# Patient Record
Sex: FEMALE | Race: WHITE
[De-identification: names, ages, dates, MRNs, and addresses within clinical notes are randomized per-mention and may not be internally consistent; named-entity substitution may affect disease eponyms.]

---

## 2018-01-26 ENCOUNTER — HOSPITAL ENCOUNTER (OUTPATIENT)
Dept: HOSPITAL 8 - STAR | Age: 78
Discharge: HOME | End: 2018-01-26
Attending: SURGERY
Payer: COMMERCIAL

## 2018-01-26 DIAGNOSIS — Z01.812: Primary | ICD-10-CM

## 2018-01-26 DIAGNOSIS — R79.89: ICD-10-CM

## 2018-01-26 LAB
ANION GAP SERPL CALC-SCNC: 7 MMOL/L (ref 5–15)
BASOPHILS # BLD AUTO: 0.05 X10^3/UL (ref 0–0.1)
BASOPHILS NFR BLD AUTO: 1 % (ref 0–1)
CALCIUM SERPL-MCNC: 9.3 MG/DL (ref 8.5–10.1)
CHLORIDE SERPL-SCNC: 104 MMOL/L (ref 98–107)
CREAT SERPL-MCNC: 0.79 MG/DL (ref 0.55–1.02)
EOSINOPHIL # BLD AUTO: 0.13 X10^3/UL (ref 0–0.4)
EOSINOPHIL NFR BLD AUTO: 2 % (ref 1–7)
ERYTHROCYTE [DISTWIDTH] IN BLOOD BY AUTOMATED COUNT: 13.8 % (ref 9.6–15.2)
LYMPHOCYTES # BLD AUTO: 1.92 X10^3/UL (ref 1–3.4)
LYMPHOCYTES NFR BLD AUTO: 27 % (ref 22–44)
MCH RBC QN AUTO: 32.7 PG (ref 27–34.8)
MCHC RBC AUTO-ENTMCNC: 33.7 G/DL (ref 32.4–35.8)
MCV RBC AUTO: 97.2 FL (ref 80–100)
MD: NO
MONOCYTES # BLD AUTO: 0.46 X10^3/UL (ref 0.2–0.8)
MONOCYTES NFR BLD AUTO: 6 % (ref 2–9)
NEUTROPHILS # BLD AUTO: 4.61 X10^3/UL (ref 1.8–6.8)
NEUTROPHILS NFR BLD AUTO: 64 % (ref 42–75)
PLATELET # BLD AUTO: 214 X10^3/UL (ref 130–400)
PMV BLD AUTO: 8.3 FL (ref 7.4–10.4)
RBC # BLD AUTO: 4.89 X10^6/UL (ref 3.82–5.3)

## 2018-01-26 PROCEDURE — 36415 COLL VENOUS BLD VENIPUNCTURE: CPT

## 2018-01-26 PROCEDURE — 80048 BASIC METABOLIC PNL TOTAL CA: CPT

## 2018-01-26 PROCEDURE — 85025 COMPLETE CBC W/AUTO DIFF WBC: CPT

## 2018-01-31 ENCOUNTER — HOSPITAL ENCOUNTER (OUTPATIENT)
Dept: HOSPITAL 8 - OUT | Age: 78
Discharge: HOME | End: 2018-01-31
Attending: SURGERY
Payer: COMMERCIAL

## 2018-01-31 VITALS — SYSTOLIC BLOOD PRESSURE: 128 MMHG | DIASTOLIC BLOOD PRESSURE: 82 MMHG

## 2018-01-31 VITALS — WEIGHT: 111.33 LBS | BODY MASS INDEX: 20.75 KG/M2 | HEIGHT: 61.5 IN

## 2018-01-31 DIAGNOSIS — I70.212: Primary | ICD-10-CM

## 2018-01-31 DIAGNOSIS — Z90.49: ICD-10-CM

## 2018-01-31 DIAGNOSIS — Z87.39: ICD-10-CM

## 2018-01-31 DIAGNOSIS — Z90.710: ICD-10-CM

## 2018-01-31 DIAGNOSIS — Z98.890: ICD-10-CM

## 2018-01-31 PROCEDURE — 37236 OPEN/PERQ PLACE STENT 1ST: CPT

## 2018-01-31 PROCEDURE — 99157 MOD SED OTHER PHYS/QHP EA: CPT

## 2018-01-31 PROCEDURE — C1769 GUIDE WIRE: HCPCS

## 2018-01-31 PROCEDURE — C1725 CATH, TRANSLUMIN NON-LASER: HCPCS

## 2018-01-31 PROCEDURE — C1876 STENT, NON-COA/NON-COV W/DEL: HCPCS

## 2018-01-31 PROCEDURE — C1894 INTRO/SHEATH, NON-LASER: HCPCS

## 2018-01-31 PROCEDURE — 99156 MOD SED OTH PHYS/QHP 5/>YRS: CPT

## 2018-01-31 PROCEDURE — 75710 ARTERY X-RAYS ARM/LEG: CPT

## 2018-09-18 LAB
ANION GAP SERPL CALC-SCNC: 7 MMOL/L (ref 5–15)
BASOPHILS # BLD AUTO: 0.03 X10^3/UL (ref 0–0.1)
BASOPHILS NFR BLD AUTO: 1 % (ref 0–1)
CALCIUM SERPL-MCNC: 9 MG/DL (ref 8.5–10.1)
CHLORIDE SERPL-SCNC: 108 MMOL/L (ref 98–107)
CREAT SERPL-MCNC: 0.79 MG/DL (ref 0.55–1.02)
EOSINOPHIL # BLD AUTO: 0.1 X10^3/UL (ref 0–0.4)
EOSINOPHIL NFR BLD AUTO: 2 % (ref 1–7)
ERYTHROCYTE [DISTWIDTH] IN BLOOD BY AUTOMATED COUNT: 14.3 % (ref 9.6–15.2)
LYMPHOCYTES # BLD AUTO: 1.91 X10^3/UL (ref 1–3.4)
LYMPHOCYTES NFR BLD AUTO: 32 % (ref 22–44)
MCH RBC QN AUTO: 32 PG (ref 27–34.8)
MCHC RBC AUTO-ENTMCNC: 33.3 G/DL (ref 32.4–35.8)
MCV RBC AUTO: 96.2 FL (ref 80–100)
MD: NO
MONOCYTES # BLD AUTO: 0.66 X10^3/UL (ref 0.2–0.8)
MONOCYTES NFR BLD AUTO: 11 % (ref 2–9)
NEUTROPHILS # BLD AUTO: 3.36 X10^3/UL (ref 1.8–6.8)
NEUTROPHILS NFR BLD AUTO: 56 % (ref 42–75)
PLATELET # BLD AUTO: 193 X10^3/UL (ref 130–400)
PMV BLD AUTO: 8.5 FL (ref 7.4–10.4)
RBC # BLD AUTO: 4.75 X10^6/UL (ref 3.82–5.3)

## 2018-09-21 ENCOUNTER — HOSPITAL ENCOUNTER (OUTPATIENT)
Dept: HOSPITAL 8 - OUT | Age: 78
Discharge: HOME | End: 2018-09-21
Attending: SURGERY
Payer: COMMERCIAL

## 2018-09-21 VITALS — WEIGHT: 113.32 LBS | BODY MASS INDEX: 21.39 KG/M2 | HEIGHT: 61 IN

## 2018-09-21 VITALS — SYSTOLIC BLOOD PRESSURE: 193 MMHG | DIASTOLIC BLOOD PRESSURE: 68 MMHG

## 2018-09-21 DIAGNOSIS — E78.00: ICD-10-CM

## 2018-09-21 DIAGNOSIS — Z90.49: ICD-10-CM

## 2018-09-21 DIAGNOSIS — Z87.891: ICD-10-CM
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
## 2018-09-21 DIAGNOSIS — I10: ICD-10-CM

## 2018-09-21 DIAGNOSIS — E03.9: ICD-10-CM
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
## 2018-09-21 DIAGNOSIS — Z98.890: ICD-10-CM

## 2018-09-21 DIAGNOSIS — M65.861: ICD-10-CM

## 2018-09-21 DIAGNOSIS — Z90.710: ICD-10-CM

## 2018-09-21 DIAGNOSIS — Z87.39: ICD-10-CM

## 2018-09-21 DIAGNOSIS — Z88.0: ICD-10-CM

## 2018-09-21 DIAGNOSIS — Z88.5: ICD-10-CM
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
## 2018-09-21 DIAGNOSIS — S83.251A: Primary | ICD-10-CM

## 2018-09-21 DIAGNOSIS — F41.9: ICD-10-CM

## 2018-09-21 PROCEDURE — 75710 ARTERY X-RAYS ARM/LEG: CPT

## 2018-09-21 PROCEDURE — 99157 MOD SED OTHER PHYS/QHP EA: CPT

## 2018-09-21 PROCEDURE — 80048 BASIC METABOLIC PNL TOTAL CA: CPT

## 2018-09-21 PROCEDURE — C1769 GUIDE WIRE: HCPCS

## 2018-09-21 PROCEDURE — C1751 CATH, INF, PER/CENT/MIDLINE: HCPCS

## 2018-09-21 PROCEDURE — 36140 INTRO NDL ICATH UPR/LXTR ART: CPT

## 2018-09-21 PROCEDURE — 36415 COLL VENOUS BLD VENIPUNCTURE: CPT

## 2018-09-21 PROCEDURE — 85025 COMPLETE CBC W/AUTO DIFF WBC: CPT

## 2018-09-21 PROCEDURE — 76937 US GUIDE VASCULAR ACCESS: CPT

## 2018-09-21 PROCEDURE — 99156 MOD SED OTH PHYS/QHP 5/>YRS: CPT

## 2018-09-21 PROCEDURE — C1894 INTRO/SHEATH, NON-LASER: HCPCS

## 2020-02-19 ENCOUNTER — APPOINTMENT (RX ONLY)
Dept: URBAN - METROPOLITAN AREA CLINIC 22 | Facility: CLINIC | Age: 80
Setting detail: DERMATOLOGY
End: 2020-02-19

## 2020-02-19 DIAGNOSIS — L82.1 OTHER SEBORRHEIC KERATOSIS: ICD-10-CM

## 2020-02-19 DIAGNOSIS — L82.0 INFLAMED SEBORRHEIC KERATOSIS: ICD-10-CM

## 2020-02-19 DIAGNOSIS — L81.4 OTHER MELANIN HYPERPIGMENTATION: ICD-10-CM

## 2020-02-19 DIAGNOSIS — D18.0 HEMANGIOMA: ICD-10-CM

## 2020-02-19 DIAGNOSIS — D22 MELANOCYTIC NEVI: ICD-10-CM

## 2020-02-19 DIAGNOSIS — Z85.828 PERSONAL HISTORY OF OTHER MALIGNANT NEOPLASM OF SKIN: ICD-10-CM

## 2020-02-19 PROBLEM — D48.5 NEOPLASM OF UNCERTAIN BEHAVIOR OF SKIN: Status: ACTIVE | Noted: 2020-02-19

## 2020-02-19 PROBLEM — D18.01 HEMANGIOMA OF SKIN AND SUBCUTANEOUS TISSUE: Status: ACTIVE | Noted: 2020-02-19

## 2020-02-19 PROBLEM — D22.5 MELANOCYTIC NEVI OF TRUNK: Status: ACTIVE | Noted: 2020-02-19

## 2020-02-19 PROCEDURE — 99203 OFFICE O/P NEW LOW 30 MIN: CPT | Mod: 25

## 2020-02-19 PROCEDURE — 11102 TANGNTL BX SKIN SINGLE LES: CPT | Mod: 59

## 2020-02-19 PROCEDURE — 17110 DESTRUCTION B9 LES UP TO 14: CPT

## 2020-02-19 PROCEDURE — ? BIOPSY BY SHAVE METHOD

## 2020-02-19 PROCEDURE — ? COUNSELING

## 2020-02-19 PROCEDURE — ? LIQUID NITROGEN

## 2020-02-19 ASSESSMENT — LOCATION SIMPLE DESCRIPTION DERM
LOCATION SIMPLE: RIGHT FOREHEAD
LOCATION SIMPLE: ABDOMEN
LOCATION SIMPLE: LEFT SUPERIOR EYELID
LOCATION SIMPLE: NOSE
LOCATION SIMPLE: LEFT FOREARM
LOCATION SIMPLE: LEFT UPPER BACK
LOCATION SIMPLE: CHEST
LOCATION SIMPLE: RIGHT CHEEK

## 2020-02-19 ASSESSMENT — LOCATION ZONE DERM
LOCATION ZONE: TRUNK
LOCATION ZONE: ARM
LOCATION ZONE: NOSE
LOCATION ZONE: FACE
LOCATION ZONE: EYELID

## 2020-02-19 ASSESSMENT — LOCATION DETAILED DESCRIPTION DERM
LOCATION DETAILED: LEFT MEDIAL SUPERIOR EYELID
LOCATION DETAILED: LEFT PROXIMAL DORSAL FOREARM
LOCATION DETAILED: NASAL ROOT
LOCATION DETAILED: RIGHT INFERIOR LATERAL FOREHEAD
LOCATION DETAILED: RIGHT SUPERIOR CENTRAL MALAR CHEEK
LOCATION DETAILED: LEFT LATERAL UPPER BACK
LOCATION DETAILED: MIDDLE STERNUM
LOCATION DETAILED: EPIGASTRIC SKIN

## 2021-01-13 DIAGNOSIS — Z23 NEED FOR VACCINATION: ICD-10-CM

## 2022-01-01 ENCOUNTER — APPOINTMENT (OUTPATIENT)
Dept: RADIOLOGY | Facility: MEDICAL CENTER | Age: 82
DRG: 196 | End: 2022-01-01
Attending: STUDENT IN AN ORGANIZED HEALTH CARE EDUCATION/TRAINING PROGRAM
Payer: MEDICARE

## 2022-01-01 ENCOUNTER — HOSPICE ADMISSION (OUTPATIENT)
Dept: HOSPICE | Facility: HOSPICE | Age: 82
End: 2022-01-01

## 2022-01-01 ENCOUNTER — APPOINTMENT (OUTPATIENT)
Dept: RADIOLOGY | Facility: MEDICAL CENTER | Age: 82
DRG: 196 | End: 2022-01-01
Attending: EMERGENCY MEDICINE
Payer: MEDICARE

## 2022-01-01 ENCOUNTER — HOSPITAL ENCOUNTER (INPATIENT)
Facility: MEDICAL CENTER | Age: 82
LOS: 2 days | DRG: 196 | End: 2022-02-02
Attending: EMERGENCY MEDICINE | Admitting: HOSPITALIST
Payer: MEDICARE

## 2022-01-01 ENCOUNTER — APPOINTMENT (OUTPATIENT)
Dept: CARDIOLOGY | Facility: MEDICAL CENTER | Age: 82
DRG: 196 | End: 2022-01-01
Attending: STUDENT IN AN ORGANIZED HEALTH CARE EDUCATION/TRAINING PROGRAM
Payer: MEDICARE

## 2022-01-01 VITALS
HEIGHT: 61 IN | WEIGHT: 106.04 LBS | SYSTOLIC BLOOD PRESSURE: 91 MMHG | HEART RATE: 101 BPM | TEMPERATURE: 97.7 F | RESPIRATION RATE: 20 BRPM | BODY MASS INDEX: 20.02 KG/M2 | DIASTOLIC BLOOD PRESSURE: 55 MMHG | OXYGEN SATURATION: 93 %

## 2022-01-01 DIAGNOSIS — J96.21 ACUTE ON CHRONIC RESPIRATORY FAILURE WITH HYPOXIA (HCC): ICD-10-CM

## 2022-01-01 DIAGNOSIS — I50.9 CONGESTIVE HEART FAILURE, UNSPECIFIED HF CHRONICITY, UNSPECIFIED HEART FAILURE TYPE (HCC): ICD-10-CM

## 2022-01-01 DIAGNOSIS — N17.9 AKI (ACUTE KIDNEY INJURY) (HCC): ICD-10-CM

## 2022-01-01 DIAGNOSIS — J84.9 INTERSTITIAL LUNG DISEASE (HCC): ICD-10-CM

## 2022-01-01 DIAGNOSIS — I48.91 ATRIAL FIBRILLATION WITH RAPID VENTRICULAR RESPONSE (HCC): ICD-10-CM

## 2022-01-01 LAB
ALBUMIN SERPL BCP-MCNC: 3.2 G/DL (ref 3.2–4.9)
ALBUMIN SERPL BCP-MCNC: 3.4 G/DL (ref 3.2–4.9)
ALBUMIN SERPL BCP-MCNC: 3.5 G/DL (ref 3.2–4.9)
ALBUMIN/GLOB SERPL: 1.5 G/DL
ALBUMIN/GLOB SERPL: 1.5 G/DL
ALBUMIN/GLOB SERPL: 1.8 G/DL
ALP SERPL-CCNC: 122 U/L (ref 30–99)
ALP SERPL-CCNC: 130 U/L (ref 30–99)
ALP SERPL-CCNC: 142 U/L (ref 30–99)
ALT SERPL-CCNC: 137 U/L (ref 2–50)
ALT SERPL-CCNC: 46 U/L (ref 2–50)
ALT SERPL-CCNC: 57 U/L (ref 2–50)
ANION GAP SERPL CALC-SCNC: 12 MMOL/L (ref 7–16)
ANION GAP SERPL CALC-SCNC: 12 MMOL/L (ref 7–16)
ANION GAP SERPL CALC-SCNC: 13 MMOL/L (ref 7–16)
ANION GAP SERPL CALC-SCNC: 9 MMOL/L (ref 7–16)
APTT PPP: 35 SEC (ref 24.7–36)
AST SERPL-CCNC: 180 U/L (ref 12–45)
AST SERPL-CCNC: 31 U/L (ref 12–45)
AST SERPL-CCNC: 53 U/L (ref 12–45)
BASOPHILS # BLD AUTO: 0 % (ref 0–1.8)
BASOPHILS # BLD AUTO: 0.4 % (ref 0–1.8)
BASOPHILS # BLD: 0 K/UL (ref 0–0.12)
BASOPHILS # BLD: 0.04 K/UL (ref 0–0.12)
BILIRUB SERPL-MCNC: 0.9 MG/DL (ref 0.1–1.5)
BILIRUB SERPL-MCNC: 1.6 MG/DL (ref 0.1–1.5)
BILIRUB SERPL-MCNC: 2 MG/DL (ref 0.1–1.5)
BUN SERPL-MCNC: 10 MG/DL (ref 8–22)
BUN SERPL-MCNC: 19 MG/DL (ref 8–22)
BUN SERPL-MCNC: 29 MG/DL (ref 8–22)
BUN SERPL-MCNC: 35 MG/DL (ref 8–22)
CALCIUM SERPL-MCNC: 7.7 MG/DL (ref 8.5–10.5)
CALCIUM SERPL-MCNC: 7.9 MG/DL (ref 8.5–10.5)
CALCIUM SERPL-MCNC: 7.9 MG/DL (ref 8.5–10.5)
CALCIUM SERPL-MCNC: 8 MG/DL (ref 8.5–10.5)
CHLORIDE SERPL-SCNC: 100 MMOL/L (ref 96–112)
CHLORIDE SERPL-SCNC: 101 MMOL/L (ref 96–112)
CHLORIDE SERPL-SCNC: 107 MMOL/L (ref 96–112)
CHLORIDE SERPL-SCNC: 99 MMOL/L (ref 96–112)
CO2 SERPL-SCNC: 26 MMOL/L (ref 20–33)
CO2 SERPL-SCNC: 26 MMOL/L (ref 20–33)
CO2 SERPL-SCNC: 28 MMOL/L (ref 20–33)
CO2 SERPL-SCNC: 29 MMOL/L (ref 20–33)
CORTIS SERPL-MCNC: 86.5 UG/DL (ref 0–23)
CREAT SERPL-MCNC: 0.44 MG/DL (ref 0.5–1.4)
CREAT SERPL-MCNC: 1.07 MG/DL (ref 0.5–1.4)
CREAT SERPL-MCNC: 1.81 MG/DL (ref 0.5–1.4)
CREAT SERPL-MCNC: 1.96 MG/DL (ref 0.5–1.4)
CRP SERPL HS-MCNC: 16.7 MG/DL (ref 0–0.75)
DIGOXIN SERPL-MCNC: 2.6 NG/ML (ref 0.8–2)
DIGOXIN SERPL-MCNC: 2.7 NG/ML (ref 0.8–2)
EKG IMPRESSION: NORMAL
EOSINOPHIL # BLD AUTO: 0 K/UL (ref 0–0.51)
EOSINOPHIL # BLD AUTO: 0.14 K/UL (ref 0–0.51)
EOSINOPHIL NFR BLD: 0 % (ref 0–6.9)
EOSINOPHIL NFR BLD: 1.5 % (ref 0–6.9)
ERYTHROCYTE [DISTWIDTH] IN BLOOD BY AUTOMATED COUNT: 49.6 FL (ref 35.9–50)
ERYTHROCYTE [DISTWIDTH] IN BLOOD BY AUTOMATED COUNT: 50.6 FL (ref 35.9–50)
ERYTHROCYTE [DISTWIDTH] IN BLOOD BY AUTOMATED COUNT: 52.8 FL (ref 35.9–50)
ERYTHROCYTE [SEDIMENTATION RATE] IN BLOOD BY WESTERGREN METHOD: 10 MM/HOUR (ref 0–25)
FLUAV RNA SPEC QL NAA+PROBE: NEGATIVE
FLUBV RNA SPEC QL NAA+PROBE: NEGATIVE
GLOBULIN SER CALC-MCNC: 1.9 G/DL (ref 1.9–3.5)
GLOBULIN SER CALC-MCNC: 2.1 G/DL (ref 1.9–3.5)
GLOBULIN SER CALC-MCNC: 2.3 G/DL (ref 1.9–3.5)
GLUCOSE SERPL-MCNC: 75 MG/DL (ref 65–99)
GLUCOSE SERPL-MCNC: 79 MG/DL (ref 65–99)
GLUCOSE SERPL-MCNC: 83 MG/DL (ref 65–99)
GLUCOSE SERPL-MCNC: 91 MG/DL (ref 65–99)
HCT VFR BLD AUTO: 39 % (ref 37–47)
HCT VFR BLD AUTO: 39.2 % (ref 37–47)
HCT VFR BLD AUTO: 41.9 % (ref 37–47)
HGB BLD-MCNC: 11.8 G/DL (ref 12–16)
HGB BLD-MCNC: 12.2 G/DL (ref 12–16)
HGB BLD-MCNC: 13 G/DL (ref 12–16)
HIV 1+2 AB+HIV1 P24 AG SERPL QL IA: NORMAL
IMM GRANULOCYTES # BLD AUTO: 0.04 K/UL (ref 0–0.11)
IMM GRANULOCYTES NFR BLD AUTO: 0.4 % (ref 0–0.9)
INR PPP: 1.21 (ref 0.87–1.13)
INR PPP: 2.68 (ref 0.87–1.13)
LACTATE BLD-SCNC: 1.9 MMOL/L (ref 0.5–2)
LACTATE BLD-SCNC: 2.3 MMOL/L (ref 0.5–2)
LV EJECT FRACT  99904: 55
LV EJECT FRACT MOD 2C 99903: 50.17
LV EJECT FRACT MOD 4C 99902: 51.43
LV EJECT FRACT MOD BP 99901: 50.48
LYMPHOCYTES # BLD AUTO: 0 K/UL (ref 1–4.8)
LYMPHOCYTES # BLD AUTO: 0.92 K/UL (ref 1–4.8)
LYMPHOCYTES NFR BLD: 0 % (ref 22–41)
LYMPHOCYTES NFR BLD: 9.8 % (ref 22–41)
MAGNESIUM SERPL-MCNC: 1.8 MG/DL (ref 1.5–2.5)
MAGNESIUM SERPL-MCNC: 1.9 MG/DL (ref 1.5–2.5)
MANUAL DIFF BLD: ABNORMAL
MCH RBC QN AUTO: 30.7 PG (ref 27–33)
MCH RBC QN AUTO: 31 PG (ref 27–33)
MCH RBC QN AUTO: 31 PG (ref 27–33)
MCHC RBC AUTO-ENTMCNC: 30.3 G/DL (ref 33.6–35)
MCHC RBC AUTO-ENTMCNC: 31 G/DL (ref 33.6–35)
MCHC RBC AUTO-ENTMCNC: 31.1 G/DL (ref 33.6–35)
MCV RBC AUTO: 102.4 FL (ref 81.4–97.8)
MCV RBC AUTO: 98.5 FL (ref 81.4–97.8)
MCV RBC AUTO: 99.8 FL (ref 81.4–97.8)
METAMYELOCYTES NFR BLD MANUAL: 18.3 %
MONOCYTES # BLD AUTO: 0.29 K/UL (ref 0–0.85)
MONOCYTES # BLD AUTO: 0.74 K/UL (ref 0–0.85)
MONOCYTES NFR BLD AUTO: 1.7 % (ref 0–13.4)
MONOCYTES NFR BLD AUTO: 7.9 % (ref 0–13.4)
MORPHOLOGY BLD-IMP: NORMAL
MYELOCYTES NFR BLD MANUAL: 1.7 %
NEUTROPHILS # BLD AUTO: 13.47 K/UL (ref 2–7.15)
NEUTROPHILS # BLD AUTO: 7.54 K/UL (ref 2–7.15)
NEUTROPHILS NFR BLD: 66.1 % (ref 44–72)
NEUTROPHILS NFR BLD: 80 % (ref 44–72)
NEUTS BAND NFR BLD MANUAL: 12.2 % (ref 0–10)
NRBC # BLD AUTO: 0 K/UL
NRBC # BLD AUTO: 0 K/UL
NRBC BLD-RTO: 0 /100 WBC
NRBC BLD-RTO: 0 /100 WBC
NT-PROBNP SERPL IA-MCNC: 1864 PG/ML (ref 0–125)
NT-PROBNP SERPL IA-MCNC: ABNORMAL PG/ML (ref 0–125)
PLATELET # BLD AUTO: 110 K/UL (ref 164–446)
PLATELET # BLD AUTO: 157 K/UL (ref 164–446)
PLATELET # BLD AUTO: 181 K/UL (ref 164–446)
PLATELET BLD QL SMEAR: NORMAL
PMV BLD AUTO: 11.5 FL (ref 9–12.9)
PMV BLD AUTO: 9.8 FL (ref 9–12.9)
PMV BLD AUTO: 9.9 FL (ref 9–12.9)
POTASSIUM SERPL-SCNC: 4 MMOL/L (ref 3.6–5.5)
POTASSIUM SERPL-SCNC: 4.2 MMOL/L (ref 3.6–5.5)
POTASSIUM SERPL-SCNC: 4.4 MMOL/L (ref 3.6–5.5)
POTASSIUM SERPL-SCNC: 4.7 MMOL/L (ref 3.6–5.5)
PROCALCITONIN SERPL-MCNC: 0.05 NG/ML
PROT SERPL-MCNC: 5.3 G/DL (ref 6–8.2)
PROT SERPL-MCNC: 5.4 G/DL (ref 6–8.2)
PROT SERPL-MCNC: 5.7 G/DL (ref 6–8.2)
PROTHROMBIN TIME: 14.9 SEC (ref 12–14.6)
PROTHROMBIN TIME: 27.7 SEC (ref 12–14.6)
RBC # BLD AUTO: 3.81 M/UL (ref 4.2–5.4)
RBC # BLD AUTO: 3.98 M/UL (ref 4.2–5.4)
RBC # BLD AUTO: 4.2 M/UL (ref 4.2–5.4)
RBC BLD AUTO: NORMAL
RHEUMATOID FACT SER IA-ACNC: 16 IU/ML (ref 0–14)
RSV RNA SPEC QL NAA+PROBE: NEGATIVE
SARS-COV-2 RNA RESP QL NAA+PROBE: NOTDETECTED
SARS-COV-2 RNA RESP QL NAA+PROBE: NOTDETECTED
SODIUM SERPL-SCNC: 140 MMOL/L (ref 135–145)
SODIUM SERPL-SCNC: 142 MMOL/L (ref 135–145)
SPECIMEN SOURCE: NORMAL
SPECIMEN SOURCE: NORMAL
T4 FREE SERPL-MCNC: 1.3 NG/DL (ref 0.93–1.7)
TROPONIN T SERPL-MCNC: 18 NG/L (ref 6–19)
TSH SERPL DL<=0.005 MIU/L-ACNC: 2.72 UIU/ML (ref 0.38–5.33)
WBC # BLD AUTO: 17.2 K/UL (ref 4.8–10.8)
WBC # BLD AUTO: 8.9 K/UL (ref 4.8–10.8)
WBC # BLD AUTO: 9.4 K/UL (ref 4.8–10.8)

## 2022-01-01 PROCEDURE — 83735 ASSAY OF MAGNESIUM: CPT

## 2022-01-01 PROCEDURE — 87389 HIV-1 AG W/HIV-1&-2 AB AG IA: CPT

## 2022-01-01 PROCEDURE — 700111 HCHG RX REV CODE 636 W/ 250 OVERRIDE (IP): Performed by: INTERNAL MEDICINE

## 2022-01-01 PROCEDURE — 700105 HCHG RX REV CODE 258: Performed by: STUDENT IN AN ORGANIZED HEALTH CARE EDUCATION/TRAINING PROGRAM

## 2022-01-01 PROCEDURE — 86331 IMMUNODIFFUSION OUCHTERLONY: CPT | Mod: 91

## 2022-01-01 PROCEDURE — 770020 HCHG ROOM/CARE - TELE (206)

## 2022-01-01 PROCEDURE — 85730 THROMBOPLASTIN TIME PARTIAL: CPT

## 2022-01-01 PROCEDURE — 80162 ASSAY OF DIGOXIN TOTAL: CPT

## 2022-01-01 PROCEDURE — 84439 ASSAY OF FREE THYROXINE: CPT

## 2022-01-01 PROCEDURE — 700102 HCHG RX REV CODE 250 W/ 637 OVERRIDE(OP): Performed by: STUDENT IN AN ORGANIZED HEALTH CARE EDUCATION/TRAINING PROGRAM

## 2022-01-01 PROCEDURE — 71045 X-RAY EXAM CHEST 1 VIEW: CPT

## 2022-01-01 PROCEDURE — 85027 COMPLETE CBC AUTOMATED: CPT

## 2022-01-01 PROCEDURE — 93306 TTE W/DOPPLER COMPLETE: CPT | Mod: 26 | Performed by: INTERNAL MEDICINE

## 2022-01-01 PROCEDURE — 99291 CRITICAL CARE FIRST HOUR: CPT | Performed by: STUDENT IN AN ORGANIZED HEALTH CARE EDUCATION/TRAINING PROGRAM

## 2022-01-01 PROCEDURE — 700111 HCHG RX REV CODE 636 W/ 250 OVERRIDE (IP): Performed by: HOSPITALIST

## 2022-01-01 PROCEDURE — 700111 HCHG RX REV CODE 636 W/ 250 OVERRIDE (IP): Performed by: EMERGENCY MEDICINE

## 2022-01-01 PROCEDURE — C9803 HOPD COVID-19 SPEC COLLECT: HCPCS | Performed by: EMERGENCY MEDICINE

## 2022-01-01 PROCEDURE — 700111 HCHG RX REV CODE 636 W/ 250 OVERRIDE (IP): Performed by: STUDENT IN AN ORGANIZED HEALTH CARE EDUCATION/TRAINING PROGRAM

## 2022-01-01 PROCEDURE — 93306 TTE W/DOPPLER COMPLETE: CPT

## 2022-01-01 PROCEDURE — A9270 NON-COVERED ITEM OR SERVICE: HCPCS | Performed by: STUDENT IN AN ORGANIZED HEALTH CARE EDUCATION/TRAINING PROGRAM

## 2022-01-01 PROCEDURE — 99232 SBSQ HOSP IP/OBS MODERATE 35: CPT | Performed by: INTERNAL MEDICINE

## 2022-01-01 PROCEDURE — 99221 1ST HOSP IP/OBS SF/LOW 40: CPT | Mod: AI,GC | Performed by: HOSPITALIST

## 2022-01-01 PROCEDURE — 36415 COLL VENOUS BLD VENIPUNCTURE: CPT

## 2022-01-01 PROCEDURE — 84145 PROCALCITONIN (PCT): CPT

## 2022-01-01 PROCEDURE — 94760 N-INVAS EAR/PLS OXIMETRY 1: CPT

## 2022-01-01 PROCEDURE — 82533 TOTAL CORTISOL: CPT

## 2022-01-01 PROCEDURE — 85007 BL SMEAR W/DIFF WBC COUNT: CPT

## 2022-01-01 PROCEDURE — 97535 SELF CARE MNGMENT TRAINING: CPT

## 2022-01-01 PROCEDURE — 97162 PT EVAL MOD COMPLEX 30 MIN: CPT

## 2022-01-01 PROCEDURE — 80053 COMPREHEN METABOLIC PANEL: CPT

## 2022-01-01 PROCEDURE — 0241U HCHG SARS-COV-2 COVID-19 NFCT DS RESP RNA 4 TRGT MIC: CPT

## 2022-01-01 PROCEDURE — 83516 IMMUNOASSAY NONANTIBODY: CPT

## 2022-01-01 PROCEDURE — 99222 1ST HOSP IP/OBS MODERATE 55: CPT | Performed by: INTERNAL MEDICINE

## 2022-01-01 PROCEDURE — 83605 ASSAY OF LACTIC ACID: CPT

## 2022-01-01 PROCEDURE — 96375 TX/PRO/DX INJ NEW DRUG ADDON: CPT

## 2022-01-01 PROCEDURE — 93010 ELECTROCARDIOGRAM REPORT: CPT | Mod: 76 | Performed by: INTERNAL MEDICINE

## 2022-01-01 PROCEDURE — 96376 TX/PRO/DX INJ SAME DRUG ADON: CPT

## 2022-01-01 PROCEDURE — 86235 NUCLEAR ANTIGEN ANTIBODY: CPT | Mod: 91

## 2022-01-01 PROCEDURE — 86200 CCP ANTIBODY: CPT

## 2022-01-01 PROCEDURE — 85652 RBC SED RATE AUTOMATED: CPT

## 2022-01-01 PROCEDURE — 96374 THER/PROPH/DIAG INJ IV PUSH: CPT

## 2022-01-01 PROCEDURE — 700111 HCHG RX REV CODE 636 W/ 250 OVERRIDE (IP)

## 2022-01-01 PROCEDURE — 86431 RHEUMATOID FACTOR QUANT: CPT

## 2022-01-01 PROCEDURE — 51798 US URINE CAPACITY MEASURE: CPT

## 2022-01-01 PROCEDURE — 73030 X-RAY EXAM OF SHOULDER: CPT | Mod: RT

## 2022-01-01 PROCEDURE — 86140 C-REACTIVE PROTEIN: CPT

## 2022-01-01 PROCEDURE — 93010 ELECTROCARDIOGRAM REPORT: CPT | Performed by: INTERNAL MEDICINE

## 2022-01-01 PROCEDURE — 93005 ELECTROCARDIOGRAM TRACING: CPT | Performed by: EMERGENCY MEDICINE

## 2022-01-01 PROCEDURE — 86038 ANTINUCLEAR ANTIBODIES: CPT

## 2022-01-01 PROCEDURE — 99221 1ST HOSP IP/OBS SF/LOW 40: CPT | Mod: GC | Performed by: INTERNAL MEDICINE

## 2022-01-01 PROCEDURE — 74018 RADEX ABDOMEN 1 VIEW: CPT

## 2022-01-01 PROCEDURE — 700117 HCHG RX CONTRAST REV CODE 255: Performed by: EMERGENCY MEDICINE

## 2022-01-01 PROCEDURE — 85610 PROTHROMBIN TIME: CPT

## 2022-01-01 PROCEDURE — 700101 HCHG RX REV CODE 250: Performed by: STUDENT IN AN ORGANIZED HEALTH CARE EDUCATION/TRAINING PROGRAM

## 2022-01-01 PROCEDURE — 93005 ELECTROCARDIOGRAM TRACING: CPT | Performed by: STUDENT IN AN ORGANIZED HEALTH CARE EDUCATION/TRAINING PROGRAM

## 2022-01-01 PROCEDURE — 99285 EMERGENCY DEPT VISIT HI MDM: CPT

## 2022-01-01 PROCEDURE — 700105 HCHG RX REV CODE 258: Performed by: HOSPITALIST

## 2022-01-01 PROCEDURE — 80048 BASIC METABOLIC PNL TOTAL CA: CPT

## 2022-01-01 PROCEDURE — 84484 ASSAY OF TROPONIN QUANT: CPT

## 2022-01-01 PROCEDURE — 99233 SBSQ HOSP IP/OBS HIGH 50: CPT | Mod: GC | Performed by: HOSPITALIST

## 2022-01-01 PROCEDURE — 85025 COMPLETE CBC W/AUTO DIFF WBC: CPT

## 2022-01-01 PROCEDURE — 86606 ASPERGILLUS ANTIBODY: CPT | Mod: 91

## 2022-01-01 PROCEDURE — 83880 ASSAY OF NATRIURETIC PEPTIDE: CPT

## 2022-01-01 PROCEDURE — 71275 CT ANGIOGRAPHY CHEST: CPT

## 2022-01-01 PROCEDURE — U0003 INFECTIOUS AGENT DETECTION BY NUCLEIC ACID (DNA OR RNA); SEVERE ACUTE RESPIRATORY SYNDROME CORONAVIRUS 2 (SARS-COV-2) (CORONAVIRUS DISEASE [COVID-19]), AMPLIFIED PROBE TECHNIQUE, MAKING USE OF HIGH THROUGHPUT TECHNOLOGIES AS DESCRIBED BY CMS-2020-01-R: HCPCS

## 2022-01-01 PROCEDURE — U0005 INFEC AGEN DETEC AMPLI PROBE: HCPCS

## 2022-01-01 PROCEDURE — 84443 ASSAY THYROID STIM HORMONE: CPT

## 2022-01-01 RX ORDER — LORAZEPAM 2 MG/ML
1 INJECTION INTRAMUSCULAR
Status: DISCONTINUED | OUTPATIENT
Start: 2022-01-01 | End: 2022-01-01 | Stop reason: HOSPADM

## 2022-01-01 RX ORDER — MORPHINE SULFATE 4 MG/ML
2 INJECTION INTRAVENOUS ONCE
Status: DISCONTINUED | OUTPATIENT
Start: 2022-01-01 | End: 2022-01-01

## 2022-01-01 RX ORDER — DIGOXIN 125 MCG
125 TABLET ORAL EVERY MORNING
Status: DISCONTINUED | OUTPATIENT
Start: 2022-01-01 | End: 2022-01-01

## 2022-01-01 RX ORDER — SODIUM CHLORIDE 9 MG/ML
500 INJECTION, SOLUTION INTRAVENOUS ONCE
Status: COMPLETED | OUTPATIENT
Start: 2022-01-01 | End: 2022-01-01

## 2022-01-01 RX ORDER — IPRATROPIUM BROMIDE AND ALBUTEROL SULFATE 2.5; .5 MG/3ML; MG/3ML
3 SOLUTION RESPIRATORY (INHALATION) EVERY 6 HOURS PRN
Status: DISCONTINUED | OUTPATIENT
Start: 2022-01-01 | End: 2022-01-01 | Stop reason: HOSPADM

## 2022-01-01 RX ORDER — FUROSEMIDE 10 MG/ML
40 INJECTION INTRAMUSCULAR; INTRAVENOUS
Status: DISCONTINUED | OUTPATIENT
Start: 2022-01-01 | End: 2022-01-01

## 2022-01-01 RX ORDER — ATORVASTATIN CALCIUM 40 MG/1
40 TABLET, FILM COATED ORAL NIGHTLY
COMMUNITY

## 2022-01-01 RX ORDER — CHOLECALCIFEROL (VITAMIN D3) 125 MCG
5 CAPSULE ORAL
Status: DISCONTINUED | OUTPATIENT
Start: 2022-01-01 | End: 2022-01-01 | Stop reason: HOSPADM

## 2022-01-01 RX ORDER — CLOPIDOGREL BISULFATE 75 MG/1
75 TABLET ORAL EVERY MORNING
COMMUNITY

## 2022-01-01 RX ORDER — DOCUSATE SODIUM 100 MG/1
100 CAPSULE, LIQUID FILLED ORAL EVERY 12 HOURS
Status: DISCONTINUED | OUTPATIENT
Start: 2022-01-01 | End: 2022-01-01 | Stop reason: HOSPADM

## 2022-01-01 RX ORDER — DIGOXIN 0.25 MG/ML
500 INJECTION INTRAMUSCULAR; INTRAVENOUS ONCE
Status: DISCONTINUED | OUTPATIENT
Start: 2022-01-01 | End: 2022-01-01

## 2022-01-01 RX ORDER — FUROSEMIDE 10 MG/ML
40 INJECTION INTRAMUSCULAR; INTRAVENOUS ONCE
Status: COMPLETED | OUTPATIENT
Start: 2022-01-01 | End: 2022-01-01

## 2022-01-01 RX ORDER — DIGOXIN 0.25 MG/ML
125 INJECTION INTRAMUSCULAR; INTRAVENOUS ONCE
Status: DISCONTINUED | OUTPATIENT
Start: 2022-01-01 | End: 2022-01-01

## 2022-01-01 RX ORDER — HYDROMORPHONE HYDROCHLORIDE 1 MG/ML
0.5 INJECTION, SOLUTION INTRAMUSCULAR; INTRAVENOUS; SUBCUTANEOUS
Status: DISCONTINUED | OUTPATIENT
Start: 2022-01-01 | End: 2022-01-01 | Stop reason: HOSPADM

## 2022-01-01 RX ORDER — FUROSEMIDE 10 MG/ML
20 INJECTION INTRAMUSCULAR; INTRAVENOUS ONCE
Status: DISCONTINUED | OUTPATIENT
Start: 2022-01-01 | End: 2022-01-01

## 2022-01-01 RX ORDER — BISACODYL 10 MG
10 SUPPOSITORY, RECTAL RECTAL
Status: DISCONTINUED | OUTPATIENT
Start: 2022-01-01 | End: 2022-01-01 | Stop reason: HOSPADM

## 2022-01-01 RX ORDER — TIZANIDINE 4 MG/1
4 TABLET ORAL EVERY 12 HOURS PRN
Status: DISCONTINUED | OUTPATIENT
Start: 2022-01-01 | End: 2022-01-01 | Stop reason: HOSPADM

## 2022-01-01 RX ORDER — POLYETHYLENE GLYCOL 3350 17 G/17G
1 POWDER, FOR SOLUTION ORAL
Status: DISCONTINUED | OUTPATIENT
Start: 2022-01-01 | End: 2022-01-01 | Stop reason: HOSPADM

## 2022-01-01 RX ORDER — POLYVINYL ALCOHOL 14 MG/ML
2 SOLUTION/ DROPS OPHTHALMIC EVERY 6 HOURS PRN
Status: DISCONTINUED | OUTPATIENT
Start: 2022-01-01 | End: 2022-01-01 | Stop reason: HOSPADM

## 2022-01-01 RX ORDER — KETOROLAC TROMETHAMINE 30 MG/ML
15 INJECTION, SOLUTION INTRAMUSCULAR; INTRAVENOUS ONCE
Status: COMPLETED | OUTPATIENT
Start: 2022-01-01 | End: 2022-01-01

## 2022-01-01 RX ORDER — DILTIAZEM HYDROCHLORIDE 5 MG/ML
10 INJECTION INTRAVENOUS
Status: DISCONTINUED | OUTPATIENT
Start: 2022-01-01 | End: 2022-01-01

## 2022-01-01 RX ORDER — DILTIAZEM HYDROCHLORIDE 5 MG/ML
0.25 INJECTION INTRAVENOUS
Status: DISCONTINUED | OUTPATIENT
Start: 2022-01-01 | End: 2022-01-01

## 2022-01-01 RX ORDER — ATROPINE SULFATE 10 MG/ML
2 SOLUTION/ DROPS OPHTHALMIC EVERY 4 HOURS PRN
Status: DISCONTINUED | OUTPATIENT
Start: 2022-01-01 | End: 2022-01-01 | Stop reason: HOSPADM

## 2022-01-01 RX ORDER — DIGOXIN 0.25 MG/ML
250 INJECTION INTRAMUSCULAR; INTRAVENOUS ONCE
Status: DISCONTINUED | OUTPATIENT
Start: 2022-01-01 | End: 2022-01-01

## 2022-01-01 RX ORDER — FUROSEMIDE 10 MG/ML
40 INJECTION INTRAMUSCULAR; INTRAVENOUS ONCE
Status: DISCONTINUED | OUTPATIENT
Start: 2022-01-01 | End: 2022-01-01

## 2022-01-01 RX ORDER — SODIUM CHLORIDE, SODIUM LACTATE, POTASSIUM CHLORIDE, AND CALCIUM CHLORIDE .6; .31; .03; .02 G/100ML; G/100ML; G/100ML; G/100ML
1000 INJECTION, SOLUTION INTRAVENOUS ONCE
Status: COMPLETED | OUTPATIENT
Start: 2022-01-01 | End: 2022-01-01

## 2022-01-01 RX ORDER — FUROSEMIDE 10 MG/ML
80 INJECTION INTRAMUSCULAR; INTRAVENOUS
Status: DISCONTINUED | OUTPATIENT
Start: 2022-01-01 | End: 2022-01-01

## 2022-01-01 RX ORDER — IPRATROPIUM BROMIDE AND ALBUTEROL SULFATE 2.5; .5 MG/3ML; MG/3ML
3 SOLUTION RESPIRATORY (INHALATION) EVERY 6 HOURS PRN
COMMUNITY

## 2022-01-01 RX ORDER — ACETAMINOPHEN 650 MG/1
650 SUPPOSITORY RECTAL EVERY 4 HOURS PRN
Status: DISCONTINUED | OUTPATIENT
Start: 2022-01-01 | End: 2022-01-01 | Stop reason: HOSPADM

## 2022-01-01 RX ORDER — ALPRAZOLAM 0.25 MG/1
0.25 TABLET ORAL 3 TIMES DAILY PRN
Status: DISCONTINUED | OUTPATIENT
Start: 2022-01-01 | End: 2022-01-01 | Stop reason: HOSPADM

## 2022-01-01 RX ORDER — LORAZEPAM 2 MG/ML
1 CONCENTRATE ORAL
Status: DISCONTINUED | OUTPATIENT
Start: 2022-01-01 | End: 2022-01-01 | Stop reason: HOSPADM

## 2022-01-01 RX ORDER — DIGOXIN 0.25 MG/ML
500 INJECTION INTRAMUSCULAR; INTRAVENOUS ONCE
Status: COMPLETED | OUTPATIENT
Start: 2022-01-01 | End: 2022-01-01

## 2022-01-01 RX ORDER — CHOLECALCIFEROL (VITAMIN D3) 125 MCG
2.5 CAPSULE ORAL NIGHTLY
Status: CANCELLED | OUTPATIENT
Start: 2022-01-01

## 2022-01-01 RX ORDER — BUDESONIDE AND FORMOTEROL FUMARATE DIHYDRATE 160; 4.5 UG/1; UG/1
2 AEROSOL RESPIRATORY (INHALATION) 2 TIMES DAILY
Status: DISCONTINUED | OUTPATIENT
Start: 2022-01-01 | End: 2022-01-01 | Stop reason: HOSPADM

## 2022-01-01 RX ORDER — ATORVASTATIN CALCIUM 40 MG/1
40 TABLET, FILM COATED ORAL NIGHTLY
Status: DISCONTINUED | OUTPATIENT
Start: 2022-01-01 | End: 2022-01-01

## 2022-01-01 RX ORDER — LORAZEPAM 2 MG/ML
0.25 INJECTION INTRAMUSCULAR EVERY 4 HOURS PRN
Status: DISCONTINUED | OUTPATIENT
Start: 2022-01-01 | End: 2022-01-01 | Stop reason: HOSPADM

## 2022-01-01 RX ORDER — AZITHROMYCIN 500 MG/5ML
500 INJECTION, POWDER, LYOPHILIZED, FOR SOLUTION INTRAVENOUS EVERY 24 HOURS
Status: DISCONTINUED | OUTPATIENT
Start: 2022-01-01 | End: 2022-01-01

## 2022-01-01 RX ORDER — ACETAMINOPHEN 325 MG/1
650 TABLET ORAL EVERY 6 HOURS PRN
Status: DISCONTINUED | OUTPATIENT
Start: 2022-01-01 | End: 2022-01-01 | Stop reason: HOSPADM

## 2022-01-01 RX ORDER — SODIUM CHLORIDE 9 MG/ML
1000 INJECTION, SOLUTION INTRAVENOUS ONCE
Status: DISCONTINUED | OUTPATIENT
Start: 2022-01-01 | End: 2022-01-01

## 2022-01-01 RX ORDER — DIGOXIN 0.25 MG/ML
250 INJECTION INTRAMUSCULAR; INTRAVENOUS ONCE
Status: COMPLETED | OUTPATIENT
Start: 2022-01-01 | End: 2022-01-01

## 2022-01-01 RX ORDER — DIGOXIN 125 MCG
125 TABLET ORAL DAILY
Status: DISCONTINUED | OUTPATIENT
Start: 2022-01-01 | End: 2022-01-01

## 2022-01-01 RX ORDER — ALPRAZOLAM 0.25 MG/1
0.25 TABLET ORAL 3 TIMES DAILY PRN
COMMUNITY

## 2022-01-01 RX ORDER — LORAZEPAM 2 MG/ML
INJECTION INTRAMUSCULAR
Status: COMPLETED
Start: 2022-01-01 | End: 2022-01-01

## 2022-01-01 RX ORDER — MORPHINE SULFATE 4 MG/ML
INJECTION INTRAVENOUS
Status: DISCONTINUED
Start: 2022-01-01 | End: 2022-01-01

## 2022-01-01 RX ORDER — AMOXICILLIN 250 MG
2 CAPSULE ORAL 2 TIMES DAILY
Status: DISCONTINUED | OUTPATIENT
Start: 2022-01-01 | End: 2022-01-01 | Stop reason: HOSPADM

## 2022-01-01 RX ORDER — DILTIAZEM HYDROCHLORIDE 5 MG/ML
0.25 INJECTION INTRAVENOUS ONCE
Status: DISCONTINUED | OUTPATIENT
Start: 2022-01-01 | End: 2022-01-01

## 2022-01-01 RX ORDER — METHYLPREDNISOLONE SODIUM SUCCINATE 125 MG/2ML
125 INJECTION, POWDER, LYOPHILIZED, FOR SOLUTION INTRAMUSCULAR; INTRAVENOUS EVERY 6 HOURS
Status: DISCONTINUED | OUTPATIENT
Start: 2022-01-01 | End: 2022-01-01

## 2022-01-01 RX ORDER — METHYLPREDNISOLONE SODIUM SUCCINATE 125 MG/2ML
INJECTION, POWDER, LYOPHILIZED, FOR SOLUTION INTRAMUSCULAR; INTRAVENOUS
Status: ACTIVE
Start: 2022-01-01 | End: 2022-01-01

## 2022-01-01 RX ADMIN — DILTIAZEM HYDROCHLORIDE 12.6 MG: 5 INJECTION INTRAVENOUS at 10:31

## 2022-01-01 RX ADMIN — APIXABAN 2.5 MG: 5 TABLET, FILM COATED ORAL at 17:01

## 2022-01-01 RX ADMIN — DICLOFENAC SODIUM 4 G: 10 GEL TOPICAL at 22:43

## 2022-01-01 RX ADMIN — ALPRAZOLAM 0.25 MG: 0.25 TABLET ORAL at 19:35

## 2022-01-01 RX ADMIN — ATORVASTATIN CALCIUM 40 MG: 40 TABLET, FILM COATED ORAL at 22:29

## 2022-01-01 RX ADMIN — TIZANIDINE 4 MG: 4 TABLET ORAL at 17:05

## 2022-01-01 RX ADMIN — FUROSEMIDE 40 MG: 10 INJECTION, SOLUTION INTRAMUSCULAR; INTRAVENOUS at 12:00

## 2022-01-01 RX ADMIN — Medication 5 MG: at 19:24

## 2022-01-01 RX ADMIN — LORAZEPAM 0.26 MG: 2 INJECTION INTRAMUSCULAR; INTRAVENOUS at 22:49

## 2022-01-01 RX ADMIN — ALPRAZOLAM 0.25 MG: 0.25 TABLET ORAL at 02:55

## 2022-01-01 RX ADMIN — DOCUSATE SODIUM 50 MG AND SENNOSIDES 8.6 MG 2 TABLET: 8.6; 5 TABLET, FILM COATED ORAL at 17:01

## 2022-01-01 RX ADMIN — DIGOXIN 250 MCG: 0.25 INJECTION INTRAMUSCULAR; INTRAVENOUS at 05:50

## 2022-01-01 RX ADMIN — LORAZEPAM 1 MG: 2 INJECTION INTRAMUSCULAR; INTRAVENOUS at 18:15

## 2022-01-01 RX ADMIN — DICLOFENAC SODIUM 4 G: 10 GEL TOPICAL at 03:13

## 2022-01-01 RX ADMIN — ATORVASTATIN CALCIUM 40 MG: 40 TABLET, FILM COATED ORAL at 17:01

## 2022-01-01 RX ADMIN — HYDROMORPHONE HYDROCHLORIDE 0.5 MG: 1 INJECTION, SOLUTION INTRAMUSCULAR; INTRAVENOUS; SUBCUTANEOUS at 17:07

## 2022-01-01 RX ADMIN — BUDESONIDE AND FORMOTEROL FUMARATE DIHYDRATE 2 PUFF: 160; 4.5 AEROSOL RESPIRATORY (INHALATION) at 17:01

## 2022-01-01 RX ADMIN — METOPROLOL TARTRATE 25 MG: 25 TABLET, FILM COATED ORAL at 17:01

## 2022-01-01 RX ADMIN — ACETAMINOPHEN 650 MG: 650 SUPPOSITORY RECTAL at 21:31

## 2022-01-01 RX ADMIN — DOXYCYCLINE 100 MG: 100 INJECTION, POWDER, LYOPHILIZED, FOR SOLUTION INTRAVENOUS at 04:23

## 2022-01-01 RX ADMIN — IOHEXOL 60 ML: 350 INJECTION, SOLUTION INTRAVENOUS at 10:55

## 2022-01-01 RX ADMIN — METOPROLOL TARTRATE 25 MG: 25 TABLET, FILM COATED ORAL at 14:23

## 2022-01-01 RX ADMIN — DOCUSATE SODIUM 50 MG AND SENNOSIDES 8.6 MG 2 TABLET: 8.6; 5 TABLET, FILM COATED ORAL at 05:50

## 2022-01-01 RX ADMIN — SODIUM CHLORIDE, POTASSIUM CHLORIDE, SODIUM LACTATE AND CALCIUM CHLORIDE 1000 ML: 600; 310; 30; 20 INJECTION, SOLUTION INTRAVENOUS at 08:42

## 2022-01-01 RX ADMIN — ACETAMINOPHEN 650 MG: 325 TABLET, FILM COATED ORAL at 06:03

## 2022-01-01 RX ADMIN — FUROSEMIDE 40 MG: 10 INJECTION, SOLUTION INTRAMUSCULAR; INTRAVENOUS at 04:12

## 2022-01-01 RX ADMIN — DOXYCYCLINE 100 MG: 100 INJECTION, POWDER, LYOPHILIZED, FOR SOLUTION INTRAVENOUS at 21:01

## 2022-01-01 RX ADMIN — APIXABAN 2.5 MG: 5 TABLET, FILM COATED ORAL at 18:09

## 2022-01-01 RX ADMIN — LORAZEPAM 1 MG: 2 INJECTION INTRAMUSCULAR; INTRAVENOUS at 16:25

## 2022-01-01 RX ADMIN — METHYLPREDNISOLONE SODIUM SUCCINATE 125 MG: 125 INJECTION, POWDER, FOR SOLUTION INTRAMUSCULAR; INTRAVENOUS at 22:54

## 2022-01-01 RX ADMIN — ACETAMINOPHEN 650 MG: 650 SUPPOSITORY RECTAL at 03:25

## 2022-01-01 RX ADMIN — LORAZEPAM 1 MG: 2 INJECTION INTRAMUSCULAR; INTRAVENOUS at 12:10

## 2022-01-01 RX ADMIN — BUDESONIDE AND FORMOTEROL FUMARATE DIHYDRATE 2 PUFF: 160; 4.5 AEROSOL RESPIRATORY (INHALATION) at 05:50

## 2022-01-01 RX ADMIN — FUROSEMIDE 40 MG: 10 INJECTION, SOLUTION INTRAMUSCULAR; INTRAVENOUS at 20:12

## 2022-01-01 RX ADMIN — Medication 5 MG: at 23:47

## 2022-01-01 RX ADMIN — FUROSEMIDE 40 MG: 10 INJECTION, SOLUTION INTRAMUSCULAR; INTRAVENOUS at 12:23

## 2022-01-01 RX ADMIN — ALPRAZOLAM 0.25 MG: 0.25 TABLET ORAL at 14:51

## 2022-01-01 RX ADMIN — HYDROMORPHONE HYDROCHLORIDE 0.5 MG: 1 INJECTION, SOLUTION INTRAMUSCULAR; INTRAVENOUS; SUBCUTANEOUS at 14:38

## 2022-01-01 RX ADMIN — SODIUM CHLORIDE 500 ML: 9 INJECTION, SOLUTION INTRAVENOUS at 10:29

## 2022-01-01 RX ADMIN — FUROSEMIDE 40 MG: 10 INJECTION, SOLUTION INTRAMUSCULAR; INTRAVENOUS at 22:51

## 2022-01-01 RX ADMIN — METOPROLOL TARTRATE 25 MG: 25 TABLET, FILM COATED ORAL at 05:55

## 2022-01-01 RX ADMIN — APIXABAN 2.5 MG: 5 TABLET, FILM COATED ORAL at 05:55

## 2022-01-01 RX ADMIN — KETOROLAC TROMETHAMINE 15 MG: 30 INJECTION, SOLUTION INTRAMUSCULAR at 14:22

## 2022-01-01 RX ADMIN — LORAZEPAM 0.26 MG: 2 INJECTION INTRAMUSCULAR; INTRAVENOUS at 08:29

## 2022-01-01 RX ADMIN — CEFTRIAXONE SODIUM 1 G: 10 INJECTION, POWDER, FOR SOLUTION INTRAVENOUS at 19:24

## 2022-01-01 RX ADMIN — METHYLPREDNISOLONE SODIUM SUCCINATE 125 MG: 125 INJECTION, POWDER, FOR SOLUTION INTRAMUSCULAR; INTRAVENOUS at 04:15

## 2022-01-01 RX ADMIN — ALPRAZOLAM 0.25 MG: 0.25 TABLET ORAL at 14:53

## 2022-01-01 RX ADMIN — ACETAMINOPHEN 650 MG: 325 TABLET, FILM COATED ORAL at 19:35

## 2022-01-01 RX ADMIN — TIZANIDINE 4 MG: 4 TABLET ORAL at 06:03

## 2022-01-01 RX ADMIN — DIGOXIN 500 MCG: 0.25 INJECTION INTRAMUSCULAR; INTRAVENOUS at 23:50

## 2022-01-01 ASSESSMENT — ENCOUNTER SYMPTOMS
MYALGIAS: 0
BLOOD IN STOOL: 0
DIZZINESS: 0
HEADACHES: 0
CHILLS: 0
WEIGHT LOSS: 1
NAUSEA: 0
ABDOMINAL PAIN: 0
NAUSEA: 0
BACK PAIN: 0
FEVER: 0
FEVER: 0
VOMITING: 0
CHILLS: 0
SLEEP DISTURBANCE: 0
COUGH: 0
DIZZINESS: 0
PALPITATIONS: 0
SHORTNESS OF BREATH: 1
SHORTNESS OF BREATH: 0
ORTHOPNEA: 1
SENSORY CHANGE: 0
SHORTNESS OF BREATH: 0
DIZZINESS: 0
WEAKNESS: 1
NECK PAIN: 0
ORTHOPNEA: 1
WEAKNESS: 1
SEIZURES: 0
WEIGHT LOSS: 1
VOMITING: 0
ABDOMINAL PAIN: 0
DIARRHEA: 0
SPUTUM PRODUCTION: 0
SHORTNESS OF BREATH: 1
AGITATION: 0
VOMITING: 0
HEMOPTYSIS: 0
CHILLS: 0
TINGLING: 0
MYALGIAS: 0
COUGH: 1
MYALGIAS: 0
CHEST TIGHTNESS: 0
TINGLING: 0
HEADACHES: 0
HEADACHES: 0
PALPITATIONS: 0
PHOTOPHOBIA: 0
NAUSEA: 0
ABDOMINAL PAIN: 0
SENSORY CHANGE: 0
NERVOUS/ANXIOUS: 1
SHORTNESS OF BREATH: 1
COUGH: 0
PALPITATIONS: 0
COUGH: 0
NERVOUS/ANXIOUS: 1
HEARTBURN: 0
WEAKNESS: 1
NERVOUS/ANXIOUS: 0
FEVER: 0
FATIGUE: 1
SPUTUM PRODUCTION: 0

## 2022-01-01 ASSESSMENT — PATIENT HEALTH QUESTIONNAIRE - PHQ9
2. FEELING DOWN, DEPRESSED, IRRITABLE, OR HOPELESS: NOT AT ALL
1. LITTLE INTEREST OR PLEASURE IN DOING THINGS: NOT AT ALL
SUM OF ALL RESPONSES TO PHQ9 QUESTIONS 1 AND 2: 0

## 2022-01-01 ASSESSMENT — PAIN DESCRIPTION - PAIN TYPE
TYPE: ACUTE PAIN
TYPE: ACUTE PAIN
TYPE: CHRONIC PAIN;ACUTE PAIN

## 2022-01-01 ASSESSMENT — COGNITIVE AND FUNCTIONAL STATUS - GENERAL
SUGGESTED CMS G CODE MODIFIER MOBILITY: CK
DAILY ACTIVITIY SCORE: 19
MOVING FROM LYING ON BACK TO SITTING ON SIDE OF FLAT BED: UNABLE
MOVING TO AND FROM BED TO CHAIR: A LITTLE
SUGGESTED CMS G CODE MODIFIER DAILY ACTIVITY: CK
HELP NEEDED FOR BATHING: A LITTLE
MOBILITY SCORE: 11
TURNING FROM BACK TO SIDE WHILE IN FLAT BAD: A LITTLE
CLIMB 3 TO 5 STEPS WITH RAILING: A LOT
SUGGESTED CMS G CODE MODIFIER MOBILITY: CL
DRESSING REGULAR LOWER BODY CLOTHING: A LITTLE
WALKING IN HOSPITAL ROOM: A LITTLE
DRESSING REGULAR UPPER BODY CLOTHING: A LITTLE
TOILETING: A LITTLE
STANDING UP FROM CHAIR USING ARMS: A LITTLE
MOVING FROM LYING ON BACK TO SITTING ON SIDE OF FLAT BED: A LITTLE
MOBILITY SCORE: 17
STANDING UP FROM CHAIR USING ARMS: A LITTLE
WALKING IN HOSPITAL ROOM: A LITTLE
CLIMB 3 TO 5 STEPS WITH RAILING: A LOT
TURNING FROM BACK TO SIDE WHILE IN FLAT BAD: UNABLE
PERSONAL GROOMING: A LITTLE
MOVING TO AND FROM BED TO CHAIR: UNABLE

## 2022-01-01 ASSESSMENT — FIBROSIS 4 INDEX: FIB4 SCORE: 3.71

## 2022-01-01 ASSESSMENT — LIFESTYLE VARIABLES
EVER FELT BAD OR GUILTY ABOUT YOUR DRINKING: NO
HOW MANY TIMES IN THE PAST YEAR HAVE YOU HAD 5 OR MORE DRINKS IN A DAY: 0
ON A TYPICAL DAY WHEN YOU DRINK ALCOHOL HOW MANY DRINKS DO YOU HAVE: 0
ALCOHOL_USE: NO
HAVE PEOPLE ANNOYED YOU BY CRITICIZING YOUR DRINKING: NO
TOTAL SCORE: 0
TOTAL SCORE: 0
AVERAGE NUMBER OF DAYS PER WEEK YOU HAVE A DRINK CONTAINING ALCOHOL: 0
TOTAL SCORE: 0
EVER HAD A DRINK FIRST THING IN THE MORNING TO STEADY YOUR NERVES TO GET RID OF A HANGOVER: NO
HAVE YOU EVER FELT YOU SHOULD CUT DOWN ON YOUR DRINKING: NO
CONSUMPTION TOTAL: NEGATIVE

## 2022-01-01 ASSESSMENT — COPD QUESTIONNAIRES
COPD SCREENING SCORE: 4
DO YOU EVER COUGH UP ANY MUCUS OR PHLEGM?: NO/ONLY WITH OCCASIONAL COLDS OR INFECTIONS
HAVE YOU SMOKED AT LEAST 100 CIGARETTES IN YOUR ENTIRE LIFE: YES
DURING THE PAST 4 WEEKS HOW MUCH DID YOU FEEL SHORT OF BREATH: NONE/LITTLE OF THE TIME

## 2022-01-01 ASSESSMENT — GAIT ASSESSMENTS
ASSISTIVE DEVICE: HAND HELD ASSIST
GAIT LEVEL OF ASSIST: MINIMAL ASSIST
DISTANCE (FEET): 5
DEVIATION: STEP TO;BRADYKINETIC;DECREASED BASE OF SUPPORT

## 2022-01-31 PROBLEM — J84.9 INTERSTITIAL LUNG DISEASE (HCC): Status: ACTIVE | Noted: 2022-01-01

## 2022-01-31 PROBLEM — M25.511 SHOULDER PAIN, RIGHT: Status: ACTIVE | Noted: 2022-01-01

## 2022-01-31 PROBLEM — I10 HYPERTENSION: Status: ACTIVE | Noted: 2022-01-01

## 2022-01-31 PROBLEM — J96.21 ACUTE ON CHRONIC RESPIRATORY FAILURE WITH HYPOXIA (HCC): Status: ACTIVE | Noted: 2022-01-01

## 2022-01-31 PROBLEM — I48.91 ATRIAL FIBRILLATION (HCC): Status: ACTIVE | Noted: 2022-01-01

## 2022-01-31 PROBLEM — F41.9 ANXIETY: Status: ACTIVE | Noted: 2022-01-01

## 2022-01-31 PROBLEM — R10.11 RUQ PAIN: Status: ACTIVE | Noted: 2022-01-01

## 2022-01-31 NOTE — ED NOTES
RN Assist:  Patient previously administered Diltiazem, see MAR.  Patient to CT via gurney, respirations even and unlabored.

## 2022-01-31 NOTE — ED TRIAGE NOTES
BIB EMS to triage  Chief Complaint   Patient presents with   • Shoulder Pain     R shoulder   • Shortness of Breath     hx pulmonary fibrosis     Pt feels like she has gained water weight and is having a hard time breathing. Pt wears O2 at 2L at home ,and is 93%. Pt is in a-fib, pt is unsure if this is new for her, denies any CP.

## 2022-01-31 NOTE — ED PROVIDER NOTES
"ED Provider Note    CHIEF COMPLAINT  Chief Complaint   Patient presents with   • Shoulder Pain     R shoulder   • Shortness of Breath     hx pulmonary fibrosis       HPI  Khushi Ojeda is a 81 y.o. female who presents with a history of breast cancer, pulmonary fibrosis, she is oxygen dependent on 2 L at home, she is vaccinated for Covid.  She reports that she is having shortness of breath and right shoulder pain that is severe.  She denies hemoptysis, she denies fever.  She reports she has new leg swelling as well.  She does not know if she has a history of atrial fibrillation, she reports she is on Plavix currently.  She describes all symptoms as severe.    REVIEW OF SYSTEMS  See HPI for further details. All other systems are negative.     PAST MEDICAL HISTORY   has a past medical history of Cancer (HCC), Cancer (HCC), and Pulmonary fibrosis (HCC).    SOCIAL HISTORY  Social History     Tobacco Use   • Smoking status: Former Smoker     Quit date:      Years since quittin.1   • Smokeless tobacco: Never Used   Substance and Sexual Activity   • Alcohol use: Never   • Drug use: Never   • Sexual activity: Not on file       SURGICAL HISTORY   has a past surgical history that includes mastectomy (Right, ) and gyn surgery.    CURRENT MEDICATIONS  Plavix    ALLERGIES  Allergies   Allergen Reactions   • Codeine Itching   • Penicillins Rash     rash       FAMILY HISTORY  No pertinent family history    PHYSICAL EXAM  VITAL SIGNS: /78   Pulse 91   Temp 36.3 °C (97.4 °F) (Temporal)   Resp 19   Ht 1.549 m (5' 1\")   Wt 50.3 kg (110 lb 14.3 oz)   SpO2 95%   BMI 20.95 kg/m²  @CHANDU[[id number]::@   Pulse ox interpretation: I interpret this pulse ox as a corrected hypoxia on oxygen.  Constitutional: Alert, moderate respiratory distress.  HENT: No signs of trauma, Bilateral external ears normal, Nose normal.   Eyes: Pupils are equal and reactive, Conjunctiva normal, Non-icteric.   Neck: Normal range of motion, No " tenderness, Supple, No stridor.   Lymphatic: No lymphadenopathy noted.   Cardiovascular: Irregular irregular, tachycardic.  Thorax & Lungs: Moderate respiratory distress, crackles in both lungs.  Abdomen: Bowel sounds normal, Soft, No tenderness, No masses, No pulsatile masses. No peritoneal signs.  Skin: Warm, Dry, No erythema, No rash.   Extremities: Intact distal pulses, 1+ nonpitting edema bilaterally in lower extremities, No tenderness, No cyanosis.  Musculoskeletal: Good range of motion in all major joints. No tenderness to palpation or major deformities noted.   Neurologic: Alert , Normal motor function, Normal sensory function, No focal deficits noted.   Psychiatric: Affect normal, Judgment normal, Mood normal.       DIAGNOSTIC STUDIES / PROCEDURES    EKG  This is a 12-lead EKG interpretation by myself.  It is atrial fibrillation with rapid ventricular response at a rate of 120.  The axis is normal.  The QRS and QT intervals are normal.  There are nonspecific ST-T wave changes, ST depression in inferior leads.  There is no old EKG for comparison.  My impression of this EKG, atrial fib with RVR, does not meet STEMI criteria at this time.      LABS  Labs Reviewed   CBC WITH DIFFERENTIAL - Abnormal; Notable for the following components:       Result Value    MCV 99.8 (*)     MCHC 31.0 (*)     RDW 50.6 (*)     Neutrophils-Polys 80.00 (*)     Lymphocytes 9.80 (*)     Neutrophils (Absolute) 7.54 (*)     Lymphs (Absolute) 0.92 (*)     All other components within normal limits   COMP METABOLIC PANEL - Abnormal; Notable for the following components:    Creatinine 0.44 (*)     Calcium 7.7 (*)     Alkaline Phosphatase 122 (*)     Total Protein 5.7 (*)     All other components within normal limits   PROTHROMBIN TIME - Abnormal; Notable for the following components:    PT 14.9 (*)     INR 1.21 (*)     All other components within normal limits    Narrative:     Indicate which anticoagulants the patient is on:->NONE    PROBRAIN NATRIURETIC PEPTIDE, NT - Abnormal; Notable for the following components:    NT-proBNP 1864 (*)     All other components within normal limits   ESTIMATED GFR   APTT    Narrative:     Indicate which anticoagulants the patient is on:->NONE   TROPONIN   COV-2, FLU A/B, AND RSV BY PCR (CEPHEID)         RADIOLOGY  CT-CTA CHEST PULMONARY ARTERY W/ RECONS   Final Result      1.  Somewhat limited exam secondary to respiratory motion. No pulmonary embolus is identified      2.  Bilateral patchy reticulation and bronchiectasis in the periphery of the lungs. Groundglass density is most pronounced in the lower lobes. There are some patchy opacities in the left upper lobe. Findings are consistent with chronic pulmonary    fibrosis. Superimposed infection may be present. If clinically indicated, follow-up imaging is recommended to ensure resolution of left upper lobe consolidation.      3.  Small bilateral pleural effusions layer posteriorly.      4.  Nonspecific hilar and mediastinal adenopathy      5.  Cardiomegaly and severe atherosclerosis            DX-CHEST-PORTABLE (1 VIEW)   Final Result      Diffuse interstitial opacities and ill-defined opacities within the lung bases which could be related to the history of pulmonary fibrosis although superimposed edema or infection cannot be excluded. Covid 19 should be excluded.              COURSE & MEDICAL DECISION MAKING  Pertinent Labs & Imaging studies reviewed. (See chart for details)    The patient presents with A. Fib with rapid ventricular response.  The patient does not know whether this is a new finding for her.  CT scan was ordered, she has a history of cancer.  Labs were ordered, Covid screening was ordered.    The patient was rate controlled with IV diltiazem for rate control, she was given 40 mg IV Lasix for CHF.    Patient's repeat rate is around 100.  Her labs are consistent with CHF.  The patient's Covid test is negative.    I spoke with the renown  hospitalist who will assess the patient for hospitalization.    The total critical care time on this patient is 40 minutes, resuscitating patient, speaking with admitting physician, and deciphering test results. This 40 minutes is exclusive of separately billable procedures.      FINAL IMPRESSION  1. Atrial fibrillation with rapid ventricular response (HCC)     2. Congestive heart failure, unspecified HF chronicity, unspecified heart failure type (HCC)         Total critical care time 40 minutes as outlined above           Electronically signed by: Grayson Maxwell M.D., 1/31/2022 10:29 AM

## 2022-02-01 PROBLEM — I27.20 PULMONARY HYPERTENSION (HCC): Status: ACTIVE | Noted: 2022-01-01

## 2022-02-01 PROBLEM — I50.30 DIASTOLIC HEART FAILURE (HCC): Status: ACTIVE | Noted: 2022-01-01

## 2022-02-01 PROBLEM — N17.9 AKI (ACUTE KIDNEY INJURY) (HCC): Status: ACTIVE | Noted: 2022-01-01

## 2022-02-01 NOTE — PROGRESS NOTES
Cardiology Daily Progress Note:     Date of Service: 2/1/2022  Primary Team: [facility] Team   Attending: Dr. Tavera  Senior Resident: Dr. Lee    Contact:  [phone]    Chief Complaint:   Cardiology was consulted for   Atrial fibrilliation w/ RVR and volume overload    Dr. Ayoub is the consulting attending    Subjective  Denies lightheadedness, has air hunger causing her to have anxiety and wanting xanax. States also having no palpitations. No chest pain that wakes her up at night. No neck palpitations, or neck bobbing. No fever, no chills and no malaise.       Interval Events   -Echocardiogram showing mild hypertensive cardiomyopathy w/ EF of 50-55% w/o wall motion abnormalities, also has b/l atrial enlargement w/ RSVP 62 mmHg   -EKG ordered  -HR still at above 113 to 130s on digoxin and metoprolol 25 mg    Update:  EKG showing atrial fibrillation w/ Rate of 91, QRS <120 ms  -stopped fluids and continue diuretics    Review of Systems:    Review of Systems   Constitutional: Positive for malaise/fatigue and weight loss.        Sleepy   Respiratory: Positive for shortness of breath. Negative for sputum production.    Cardiovascular: Positive for orthopnea. Negative for chest pain, palpitations and leg swelling.   Neurological: Positive for weakness. Negative for dizziness.   Psychiatric/Behavioral: The patient is nervous/anxious.         Air hunger inducing anxiety       Objective Data:   Physical Exam:   Vitals:   Temp:  [36.3 °C (97.4 °F)-37.9 °C (100.2 °F)] 37.9 °C (100.2 °F)  Pulse:  [] 95  Resp:  [18-22] 20  BP: ()/(36-87) 81/36  SpO2:  [80 %-100 %] 92 %       Physical Exam  Constitutional:       General: She is in acute distress.      Appearance: She is ill-appearing and toxic-appearing.      Comments: somnolent   HENT:      Mouth/Throat:      Mouth: Mucous membranes are dry.   Eyes:      General: No scleral icterus.  Cardiovascular:      Rate and Rhythm: Normal rate. Rhythm irregular.       Heart sounds: Murmur heard.        Comments: JVP up to the angle of the jaw  Pulmonary:      Effort: Respiratory distress present.   Abdominal:      General: Abdomen is flat. Bowel sounds are normal.      Palpations: Abdomen is soft.   Musculoskeletal:      Right lower leg: Edema present.      Left lower leg: Edema present.   Skin:     Coloration: Skin is not jaundiced.   Neurological:      General: No focal deficit present.      Mental Status: She is oriented to person, place, and time.      Cranial Nerves: No cranial nerve deficit.      Sensory: No sensory deficit.      Motor: Weakness present.   Psychiatric:         Mood and Affect: Mood normal.         Behavior: Behavior normal.         Thought Content: Thought content normal.         Judgment: Judgment normal.           Labs:   WBC ct 8.9   Hgb 12.2  Plt 157    Na 140  K 4.2  Bun/crea 19/1.07  AST/ALT 53/57    T.bili 1.6    Troponin 18   Negative procalcitonin, SARS,  TSH and Free T4 2.7 and 1.3 (WNL)     Update:  Digoxin level 2.7.     Imaging:   Echocardiogram showing:  -mild hypertensive cardiomyopathy w/ EF of 55%, w/o RV failure RSVP of 62 mmHg. Moderate mitral regurgitation    -Long (2.3 cm) calcified mass extending in the left ventricular outflow tract appears attached to the ventricular surface of the non coronary cusp  -B/l atrial enlargement  -moderate tricuspid regurgitation  -moderate mitral regurgitation   --LVOT, MV and AV outflow not showing velocity increase.       CT PA   --showing no PE, and bibasilar fibrosis w/ bronchiectasis  --b/l pleural effusion  --per CT-PA showing severe ASVD w/ calcification extending from the MV lateral and septal/medial wall of the LVOT and AV     Problem Representation:   Mrs. Kate is a 80 y/o F w/ hx of known ILD/pulmonary fibrosis, bronchiectasis w/o hx of stroke/dysphagia and GERD, ASCVD/CAD per CT chest and echocardiogram showing b/l atrial enlargement and w/o cor pulmonale physiology (though has  hepatic congestion) but has RSVP 62 and normal RV function, moderate mitral valve regurgitation who now has increasing oxygen requirement likely causing more dyspnea and pleural effusion 2/2 increasing LVEDP from worsening fibrosis/pulm hypertension and fluid overload from fluid boluses.  Palliative care consulted.         #Atrial fibrillation w/ RVR  Likely cause is multifactorial from volume overload and structural nidus LA/RA enlargement.    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 Improving volume status via diuresis will improve patient's afib and oxygenation.     Plan  -follow up K and Mg while being on diuretics  -continue to improve volume overload-- stopped fluids and continue good diuresis.   -continue  Metoprolol 25 mg BID titrate up if BP permitting  -increase digoxin if metoprolol increase is not improving afib but monitor digoxin toxicity; hold if digoxin is supratherapeutic.    -can add diltiazem and start 30 mg qdaily and titrate up per HR if not improving on increased digoxin and metoprolol     -start cardiac diet w/ salt 2g/24 hr and 2l/24 hr water restriction  -can add spironolactone for post -diuretic fluid retention if resistant to diuresis and blood pressure, potassium level and creatinine permitting.     -continue apixaban 2.5mg BID  -Cardioversion only if symptomatic.     -continue to improve oxygenation and LVEDP increase, hypoxic vasoconstriction 2/2  pulm hypertension per primary team and pulmonology--on diuretics and oxygen        #Type II and III pulm hypertension  #Pulm hypertension 2/2 ILD/pulmonary fibrosis-  #compensatory metabolic alkalosis HCO3 >27  for respiratory acidosis from pulm fibrosis.   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 Air hunger likely will improve with fluid removal and not Xanax    Plan  -continue diuretics and oxygen to improve type II and III pulm HTN  -UOP target net negative 1.5 to 2L /24 hr  -electrolyte repletion   -cardiac diet.       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 Likely this is benign, not causing hemodynamic compromise per measurement of LVOT velocity gradient and MV and AV valve gradient.     Would not surgically operate if pt has no TIA or stroke and head imaging showing calcified material embolized to the brain.     Plan  -nothing to follow.      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     Plan  -continue diuresis to put pt on optimal PV loop and starling curve.     #modrate tricuspid regurgitation -- hepatocongestion (per labs)-- improve RSVP 2/2 pulm HTN    Plan  -continue to improve pulm HTN  -diuretics as needed  -continue oxygen    #dyspnea  #moderate mitral regurgitation   Stable. No chest pain waking patient up while having hypotensive events.     Plan  -follow up cardiology outpatient

## 2022-02-01 NOTE — ASSESSMENT & PLAN NOTE
Bibasilar infiltrates seen on CT  Patient was diagnosed interstitial lung disease following hospitalization for pneumonia approximately 1-1/2 years ago.  Continued home Breo and as needed DuoNebs  She is given Lasix 40 IV on admission  She is on 2 L of supplemental oxygen at home, between 2 and 4 L while inpatient  We will hold off on further diuresis for now as her blood pressures may be labile during treatment for A. fib RVR.

## 2022-02-01 NOTE — CARE PLAN
The patient is Watcher - Medium risk of patient condition declining or worsening    Shift Goals  Clinical Goals: improve BP  Patient Goals: rest    Progress made toward(s) clinical / shift goals:  patient hypotensive, FVO. Lasix ordered. Pt educated on medication, verbalized understanding.     Problem: Knowledge Deficit - Standard  Goal: Patient and family/care givers will demonstrate understanding of plan of care, disease process/condition, diagnostic tests and medications  Outcome: Progressing     Problem: Fall Risk  Goal: Patient will remain free from falls  Outcome: Progressing       Patient is not progressing towards the following goals:

## 2022-02-01 NOTE — ASSESSMENT & PLAN NOTE
Patient reports right shoulder pain for several days  On physical exam she had some limited range of motion regarding abduction to about 120 degrees, negative Chen and Neer test.   Xray with no significant osteoarthritis   Rotator cuff injury is less likely.  Patient may have a component of adhesive capsulitis and also is tender along her trapezius  Ordered heating pack, as needed tizanidine for 3 doses  PT consulted

## 2022-02-01 NOTE — CONSULTS
"Cardiology Initial Consultation    Date of Service  1/31/2022    Referring Physician  CAROLYN Ayoub M.D.    Reason for Consultation  Atrial fibrillation    History of Presenting Illness  Khushi Ojeda is a 81 y.o. female with a past medical history of pulmonary fibrosis, chronic respiratory failure on continuous O2, hypertension, breast cancer S/P right mastectomy 1987 radiation therapy chemotherapy, uterine cancer, \"blocked artery\" to the right arm, peripheral neuropathy who reports that for the past few days she has felt more generalized fatigue, tiredness, mild shortness of breath and becoming more nervous and scared with worsening right shoulder pain which she states has been chronic for years.  She finally contacted EMS who brought her in for evaluation around 9:15 AM this morning.      In the ER rhythm monitored showed atrial fibrillation with a rate in the 120s.  Over the course the day her oxygen requirements gradually went up reportedly to 12 L/min, currently at 3.5 L/min.  SARS-CoV-2 negative.  The patient has been given as needed boluses of IV diltiazem for elevated heart rates.  After CXR and chest CTA suggested possible edema and small effusions she received 1 dose of IV Lasix with concern for heart failure.  Cardiology was contacted for further management recommendations.    Currently the patient denies any shortness of breath or chest pain.  Her shoulder pain is now intermittent.  She denies any fevers, chills or sweats.  She has had some mild pedal edema.  No PND orthopnea or productive cough.  She sees Dr. Prashanth Dove, [facility] cardiology but does not know exactly why though she reports having a \"blocked artery\" to my right arm.  She has not been aware of having had atrial fibrillation.  She reports Dr. Dove had previously done a stress test and an echocardiogram 3 years ago and a more recent procedure though she cannot recall what it was.  She takes metoprolol for hypertension.  No history of " "diabetes mellitus or dyslipidemia.  She is unaware of any thyroid problems or prior stroke.    The patient states that after suffering pneumonia a year and a half ago she developed her \"pulmonary fibrosis\" and has been followed by Andry Covington MD, pulmonologist    The patient lives alone and states that she has a friend and a granddaughter who care for her, run her errands and does her shopping.    Review of Systems  Review of Systems   Constitutional: Positive for fatigue. Negative for chills and fever.   HENT: Negative for hearing loss and nosebleeds.    Eyes: Negative for photophobia and visual disturbance.   Respiratory: Positive for shortness of breath. Negative for cough and chest tightness.    Cardiovascular: Negative for chest pain, palpitations and leg swelling.   Gastrointestinal: Negative for abdominal pain, blood in stool, diarrhea, nausea and vomiting.   Genitourinary: Negative for dysuria and hematuria.   Musculoskeletal: Negative for back pain, myalgias and neck pain.   Skin: Negative for rash.   Neurological: Positive for weakness. Negative for dizziness, seizures, syncope and headaches.   Psychiatric/Behavioral: Negative for agitation and sleep disturbance. The patient is not nervous/anxious.        Past Medical History   has a past medical history of Cancer (HCC), Cancer (HCC), and Pulmonary fibrosis (HCC).    Surgical History   has a past surgical history that includes mastectomy (Right, 1987) and gyn surgery.    Family History  No family history of premature heart disease.    Social History   reports that she quit smoking about 82 years ago. She has never used smokeless tobacco. She reports that she does not drink alcohol and does not use drugs.    Medications  Prior to Admission Medications   Prescriptions Last Dose Informant Patient Reported? Taking?   ALPRAZolam (XANAX) 0.25 MG Tab 1/31/2022 at am  Yes Yes   Sig: Take 0.25 mg by mouth 3 times a day as needed for Anxiety.   Fluticasone " Furoate-Vilanterol (BREO ELLIPTA) 100-25 MCG/INH AEROSOL POWDER, BREATH ACTIVATED 1/30/2022 at Unknown time  Yes Yes   Sig: Inhale 1 Puff every day.   atorvastatin (LIPITOR) 40 MG Tab 1/30/2022 at Unknown time  Yes Yes   Sig: Take 40 mg by mouth every evening.   clopidogrel (PLAVIX) 75 MG Tab 1/30/2022 at Unknown time  Yes Yes   Sig: Take 75 mg by mouth every morning.   ipratropium-albuterol (DUONEB) 0.5-2.5 (3) MG/3ML nebulizer solution unknown at Unknown time  Yes Yes   Sig: Take 3 mL by nebulization every 6 hours as needed for Shortness of Breath.   metoprolol tartrate (LOPRESSOR) 25 MG Tab 1/30/2022 at hs  Yes Yes   Sig: Take 25 mg by mouth 2 times a day.      Facility-Administered Medications: None       Allergies  Allergies   Allergen Reactions   • Codeine Itching   • Penicillins Rash     rash       Vital signs in last 24 hours  Temp:  [36.3 °C (97.4 °F)] 36.3 °C (97.4 °F)  Pulse:  [] 139  Resp:  [18-20] 20  BP: ()/(48-87) 105/55  SpO2:  [80 %-100 %] 88 %    Physical Exam  Physical Exam  Constitutional:       General: She is not in acute distress.     Comments: Frail, petite, elderly female.  Nasal cannula in place.   HENT:      Head: Normocephalic.   Eyes:      General: No scleral icterus.     Conjunctiva/sclera: Conjunctivae normal.      Pupils: Pupils are equal, round, and reactive to light.   Neck:      Thyroid: No thyromegaly.      Vascular: No carotid bruit.      Comments: Normal jugular venous pressure.  Cardiovascular:      Rate and Rhythm: Tachycardia present. Rhythm irregular.      Pulses:           Carotid pulses are 1+ on the right side and 1+ on the left side.       Radial pulses are 0 on the right side and 1+ on the left side.        Posterior tibial pulses are 1+ on the right side and 1+ on the left side.      Heart sounds: S1 normal and S2 normal. No murmur heard.  No friction rub. No gallop.    Pulmonary:      Effort: Pulmonary effort is normal.      Breath sounds: Rales present.  No wheezing or rhonchi.      Comments: Diffuse loud bilateral coarse rales throughout the entire posterior and anterior lung fields  Abdominal:      General: Bowel sounds are normal. There is no abdominal bruit.      Palpations: Abdomen is soft. There is no mass or pulsatile mass.      Tenderness: There is no abdominal tenderness.   Musculoskeletal:      Right lower leg: No edema.      Left lower leg: No edema.      Comments: Muscle wasting.  Left shoulder scar.  No palpable tenderness of the right shoulder.   Lymphadenopathy:      Cervical: No cervical adenopathy.   Skin:     General: Skin is warm and dry.      Nails: There is no clubbing.   Neurological:      Mental Status: She is alert and oriented to person, place, and time.   Psychiatric:         Behavior: Behavior normal.         Lab Review  Lab Results   Component Value Date/Time    WBC 9.4 01/31/2022 09:24 AM    RBC 4.20 01/31/2022 09:24 AM    HEMOGLOBIN 13.0 01/31/2022 09:24 AM    HEMATOCRIT 41.9 01/31/2022 09:24 AM    MCV 99.8 (H) 01/31/2022 09:24 AM    MCH 31.0 01/31/2022 09:24 AM    MCHC 31.0 (L) 01/31/2022 09:24 AM    MPV 9.8 01/31/2022 09:24 AM      Lab Results   Component Value Date/Time    SODIUM 142 01/31/2022 09:24 AM    POTASSIUM 4.0 01/31/2022 09:24 AM    CHLORIDE 107 01/31/2022 09:24 AM    CO2 26 01/31/2022 09:24 AM    GLUCOSE 91 01/31/2022 09:24 AM    BUN 10 01/31/2022 09:24 AM    CREATININE 0.44 (L) 01/31/2022 09:24 AM      Lab Results   Component Value Date/Time    ASTSGOT 31 01/31/2022 09:24 AM    ALTSGPT 46 01/31/2022 09:24 AM     Lab Results   Component Value Date/Time    TROPONINT 18 01/31/2022 09:24 AM       Recent Labs     01/31/22  0924   NTPROBNP 1864*       Cardiac Imaging and Procedures Review  EKG:  My personal interpretation of the EKG dated 1/31/2022 is atrial fibrillation, rate 114    Imaging  Chest X-Ray: 1/31/2022  Diffuse interstitial opacities and ill-defined opacities within the lung bases which could be related to the  history of pulmonary fibrosis although superimposed edema or infection cannot be excluded. Covid 19 should be excluded     CHEST CTA 1/31/2022  1.  Somewhat limited exam secondary to respiratory motion. No pulmonary embolus is identified  2.  Bilateral patchy reticulation and bronchiectasis in the periphery of the lungs. Groundglass density is most pronounced in the lower lobes. There are some patchy opacities in the left upper lobe. Findings are consistent with chronic pulmonary   fibrosis. Superimposed infection may be present. If clinically indicated, follow-up imaging is recommended to ensure resolution of left upper lobe consolidation.  3.  Small bilateral pleural effusions layer posteriorly.  4.  Nonspecific hilar and mediastinal adenopathy  5.  Cardiomegaly and severe atherosclerosis    Assessment  1.  Atrial fibrillation, rapid ventricular response, duration unknown.  2.  Acute on chronic respiratory failure with hypoxemia.  3.  Pulmonary fibrosis/interstitial lung disease  4.  General debility and frailty.  5. ? Right subclavian artery stenosis.  6.  Hypertension  7.  S/P right mastectomy for breast cancer 1987 S/P radiation and chemotherapy.  8.  Uterine cancer S/P hysterectomy.    Recommendation Discussion  1.  Continue metoprolol 25 mg twice a day  2.  Will add digoxin initially IV then p.o.  3.  Can consider addition of Cardizem if necessary after LVEF known.  4.  Agree with Eliquis adjusted for age and weight.  5.  Check thyroid function test.  6.  Await echocardiogram.  7.  Hold additional diuretics until echocardiogram reviewed.  8.  Consider pulmonary consultation.  9.  I requested that Dr. Burger contact [facility] for medical records pertaining to her cardiac problems and other medical issues.  Will also be able to speak with Dr. Dove or his office in the morning.    Thank you for allowing me to participate in the care of this patient.    Please contact me with any  questions.    Rikki Lopez M.D.   Cardiologist, [facility]  [phone]

## 2022-02-01 NOTE — ASSESSMENT & PLAN NOTE
Heart rate 100-130 on admission  [id number] =3  Troponin 18, proBNP 1864, INR 1.2  EKG pending  Patient was treated with diltiazem pushes and restarted on her home medication of Lopressor 25.  She was not on anticoagulation at home and was initiated on apixaban 2.5 mg dose for age and weight.  Echocardiogram has been ordered.  If she is unable to rate control and echocardiogram does not show any signs of atrial appendage thrombus she may require chemical or electrical cardioversion if she becomes hemodynamically unstable.

## 2022-02-01 NOTE — H&P
History & Physical Note    Date of Admission: 1/31/2022  Admission Status: Inpatient  [facility] Team:   Attending: CAROLYN Ayoub M.D.   Senior Resident: Dr.Sylvia Garcia  Contact Number: [phone]    Chief Complaint: general malaise,chest discomfort.    History of Present Illness (HPI):Khushi is a 81 y.o. female with past medical history of breast cancer status postmastectomy and chemo, uterine cancer status post hysterectomy in 1983, cholecystectomy, atrial fibrillation, hypertension, interstitial lung disease diagnosed 1-1/2 years ago after hospitalization for pneumonia, anxiety on Xanax 0.25 mg 3 times daily who presented 1/31/2022 with shortness of breath and right shoulder pain and generalized weakness and malaise. She reports shoulder pain for several days.  She is on home oxygen at 2 L for the past year.      On admission she was requiring between 3 to 4 L.  Heart rate was 125, telemetry revealed atrial fibrillation with RVR,  respiratory rate 18-19, blood pressure 1 14-1 50 systolic 70-80 diastolic.  Patient was negative for COVID-19.  BNP was elevated at 1864, troponins were 18, , calcium 7.7, corrected to 8.2.  X-ray revealed diffuse interstitial opacity and ill-defined opacities within the lung bases.  CT PE revealed no pulmonary embolus, groundglass opacities more pronounced along lower lobes, and small bilateral pleural effusions, nonspecific hilar mediastinal adenopathy.   She was treated with diltiazem pushes  for afib RVR, and Lasix 40 IV, in addition to Lopressor and DuoNeb.     Review of Systems:ROS patient reported shortness of breath, right shoulder pain and neck pain, right upper quadrant pain which is constant and nonradiating in nature.  She denied any nausea, vomiting, diarrhea, hematochezia, melena, dysuria, hematuria.  Patient also reported that she has a lump on her right buttocks which has been there for some time with no skin breakdown or pain unless she sits down.      Past  Medical History:   Past Medical History was reviewed with patient.   has a past medical history of Cancer (HCC), Cancer (HCC), and Pulmonary fibrosis (HCC).    Past Surgical History: Past Surgical History was reviewed with patient.   has a past surgical history that includes mastectomy (Right, 1987) and gyn surgery.    Medications: Medications have been reviewed with patient.  Prior to Admission Medications   Prescriptions Last Dose Informant Patient Reported? Taking?   ALPRAZolam (XANAX) 0.25 MG Tab 1/31/2022 at am  Yes Yes   Sig: Take 0.25 mg by mouth 3 times a day as needed for Anxiety.   Fluticasone Furoate-Vilanterol (BREO ELLIPTA) 100-25 MCG/INH AEROSOL POWDER, BREATH ACTIVATED 1/30/2022 at Unknown time  Yes Yes   Sig: Inhale 1 Puff every day.   atorvastatin (LIPITOR) 40 MG Tab 1/30/2022 at Unknown time  Yes Yes   Sig: Take 40 mg by mouth every evening.   clopidogrel (PLAVIX) 75 MG Tab 1/30/2022 at Unknown time  Yes Yes   Sig: Take 75 mg by mouth every morning.   ipratropium-albuterol (DUONEB) 0.5-2.5 (3) MG/3ML nebulizer solution unknown at Unknown time  Yes Yes   Sig: Take 3 mL by nebulization every 6 hours as needed for Shortness of Breath.   metoprolol tartrate (LOPRESSOR) 25 MG Tab 1/30/2022 at hs  Yes Yes   Sig: Take 25 mg by mouth 2 times a day.      Facility-Administered Medications: None        Allergies: Allergies have been reviewed with patient.  Allergies   Allergen Reactions   • Codeine Itching   • Penicillins Rash     rash       Family History: Noncontributory  family history is not on file.     Social History:   Tobacco: Former smoker quit more than 20 years ago  Alcohol: None  Recreational drugs (illegal and prescription): None  Employment: Not applicable  Activity Level: Ambulates independently  Living situation: Lives alone  Recent travel: None  Primary Care Provider: reviewed No primary care provider on file.  Other (stressors, spirituality, exposures): Patient has a past medical history of  anxiety  Physical Exam:     Vitals:  Temp:  [36.3 °C (97.4 °F)] 36.3 °C (97.4 °F)  Pulse:  [] 119  Resp:  [18-20] 18  BP: (114-151)/(55-87) 124/55  SpO2:  [91 %-100 %] 91 %    Physical Exam  Constitutional:       Appearance: Normal appearance.   HENT:      Head: Normocephalic and atraumatic.      Nose: Nose normal.      Mouth/Throat:      Mouth: Mucous membranes are moist.      Pharynx: Oropharynx is clear.   Eyes:      Conjunctiva/sclera: Conjunctivae normal.      Pupils: Pupils are equal, round, and reactive to light.   Cardiovascular:      Rate and Rhythm: Tachycardia present.      Heart sounds: No murmur heard.      Pulmonary:      Breath sounds: No rhonchi.      Comments: Crackles at bases bilaterally  Abdominal:      Comments: Mild right upper quadrant tenderness, no guarding, no rebound   Musculoskeletal:         General: No swelling.      Cervical back: Normal range of motion.      Comments: Patient can raise right upper extremity to 120 degree abduction full range of motion with internal and external rotation, negative Hawkin's test, negative Cleveland's test,     Mobile solid mass along right ischial tuberosity   Skin:     General: Skin is warm and dry.   Neurological:      General: No focal deficit present.      Mental Status: She is alert.   Psychiatric:      Comments: Anxious         Labs:   Recent Labs     01/31/22  0924   SODIUM 142   POTASSIUM 4.0   CHLORIDE 107   CO2 26   GLUCOSE 91   BUN 10     Recent Labs     01/31/22  0924   ALBUMIN 3.4   TBILIRUBIN 0.9   ALKPHOSPHAT 122*   TOTPROTEIN 5.7*   ALTSGPT 46   ASTSGOT 31   CREATININE 0.44*       EKG: Pending    Imaging:   DX-SHOULDER 2+ RIGHT   Final Result      1.  No fracture or dislocation. No significant osteoarthrosis.   2.  Ill-defined pulmonary opacities in the right lung.      [id number] VIEW   Final Result      Nonobstructive bowel gas pattern. Postsurgical changes with multiple surgical clips projecting within the abdomen and pelvis.       EC-ECHOCARDIOGRAM COMPLETE W/O CONT         CT-CTA CHEST PULMONARY ARTERY W/ RECONS   Final Result      1.  Somewhat limited exam secondary to respiratory motion. No pulmonary embolus is identified      2.  Bilateral patchy reticulation and bronchiectasis in the periphery of the lungs. Groundglass density is most pronounced in the lower lobes. There are some patchy opacities in the left upper lobe. Findings are consistent with chronic pulmonary    fibrosis. Superimposed infection may be present. If clinically indicated, follow-up imaging is recommended to ensure resolution of left upper lobe consolidation.      3.  Small bilateral pleural effusions layer posteriorly.      4.  Nonspecific hilar and mediastinal adenopathy      5.  Cardiomegaly and severe atherosclerosis            DX-CHEST-PORTABLE (1 VIEW)   Final Result      Diffuse interstitial opacities and ill-defined opacities within the lung bases which could be related to the history of pulmonary fibrosis although superimposed edema or infection cannot be excluded. Covid 19 should be excluded.          Previous Data Review: reviewed    Problem Representation:        * Atrial fibrillation (HCC)- (present on admission)  Assessment & Plan  Heart rate 100-130 on admission  [id number] =3  Troponin 18, proBNP 1864, INR 1.2  EKG pending  Patient was treated with diltiazem pushes and restarted on her home medication of Lopressor 25.  She was not on anticoagulation at home and was initiated on apixaban 2.5 mg dose for age and weight.  Echocardiogram has been ordered.  If she is unable to rate control and echocardiogram does not show any signs of atrial appendage thrombus she may require chemical or electrical cardioversion if she becomes hemodynamically unstable.    Acute on chronic respiratory failure with hypoxia (HCC)  Assessment & Plan  Secondary to fluid overload and ILD  CT negative for PE, with hazy ground glass opacities along bases and small B/L pleural  effusions and mediastinal adenopathy  On home 2 L now on 3-4 after lasix 40  COVID negative on admission, CBC with no leukocytosis  procalcitonin pending      Anxiety  Assessment & Plan  Continue home Xanax .25mg TID PRN    RUQ pain  Assessment & Plan  S/p cholecystectomy  KUB with no obstructive bowel pattern  Pain resolved following 1 time dose toradol  Likely related to muscle cramp.     Shoulder pain, right  Assessment & Plan  Patient reports right shoulder pain for several days  On physical exam she had some limited range of motion regarding abduction to about 120 degrees, negative Chen and Neer test.   Xray with no significant osteoarthritis   Rotator cuff injury is less likely.  Patient may have a component of adhesive capsulitis and also is tender along her trapezius  Ordered heating pack, as needed tizanidine for 3 doses  PT consulted    Hypertension  Assessment & Plan  Blood pressure 1/14/2050 on admission  Continue labetalol 25 mg twice daily  Continue to monitor with Cardizem pushes    Interstitial lung disease (HCC)  Assessment & Plan  Bibasilar infiltrates seen on CT  Patient was diagnosed interstitial lung disease following hospitalization for pneumonia approximately 1-1/2 years ago.  Continued home Breo and as needed DuoNebs  She is given Lasix 40 IV on admission  She is on 2 L of supplemental oxygen at home, between 2 and 4 L while inpatient  We will hold off on further diuresis for now as her blood pressures may be labile during treatment for A. fib RVR.       CODE: DNR DNI  Diet: cardiac  IVF: none  DVT ppx: apixaban

## 2022-02-01 NOTE — ASSESSMENT & PLAN NOTE
S/p cholecystectomy  KUB with no obstructive bowel pattern  Pain resolved following 1 time dose toradol  Likely related to muscle cramp.

## 2022-02-01 NOTE — ASSESSMENT & PLAN NOTE
Blood pressure 1/14/2050 on admission  Continue labetalol 25 mg twice daily  Continue to monitor with Cardizem pushes

## 2022-02-01 NOTE — ED NOTES
"Report from NINA, RN. Walked into pt's room and found pt sitting on side of bed restless. Pt tachyphrenic and verbalizing, \" I think I'm having an anxiety and need xanax.\" Medicated pt per MAR. Provided tylenol for shoulder pain. Pt desaturating to 75% on 2L n/c. Placed pt on oxy mask and turned O2 to 12 L. Called resident and updated on pt's status, orders received.   "

## 2022-02-01 NOTE — PROGRESS NOTES
Patient hypotensive, asymptomatic. 1.5 L fluid bolus ordered. Upon evaluation by cardiology, patient fluid overloaded. Fluids discontinued, lasix ordred. Per cards, hold off on xanax. Okay to give lasix with low BP.   Critical lab result of 2.7. Dr. Desai notified. Digoxin held.

## 2022-02-01 NOTE — ASSESSMENT & PLAN NOTE
Secondary to fluid overload and ILD  CT negative for PE, with hazy ground glass opacities along bases and small B/L pleural effusions and mediastinal adenopathy  On home 2 L now on 3-4 after lasix 40  COVID negative on admission, CBC with no leukocytosis  procalcitonin pending

## 2022-02-01 NOTE — NON-PROVIDER
"This note is intended for the purposes of medical student education and feedback only.   Please refer to the documentation by this patient's assigned medical practitioner for details of care and plans.    Medical Student Progress Note  Note Author: Moytania Vazquez  Date: 2/1/2022    Date of Admission: 1/31/2022  Primary Team: [facility]  Attending: Dr. Ayoub  Senior Resident: Dr. Desai  Intern: Dr. Mccain  Medical Student: Moy Vazquez MS3    ID/CC: Khushi Ojeda is a 81 y.o. female with a PMH of interstitial lung disease, breast cancer, uterine cancer, hypertension and a possible left subclavian blockage who was admitted on 1/31/2022 with atrial fibrillation with RVR and increasing oxygen requirements.     SUBJECTIVE  Interval Update for 2/1/2022  The patient had one episode of increased heart rate at 131 and received 500 mcg of digoxin at 2350 with report improvement of her heart rate to the mid 90s. The patient was asymptomatic during this event. The patient had no other overnight events but was noted this morning to have a low blood pressure of 76/34 and was confirmed manually. The patient denied any chest pain, SOB, dizziness or changes in mentation during the episode. The patient was given a 1L fluid bolus with improvement of BP to low 90s.     Review of Systems  Positive for right shoulder pain  Negative for chest pain, SOB, dizziness, headache or altered mental status. All other ROS negative except mentioned above.         OBJECTIVE   Physical Exam:  BP (!) 81/36   Pulse 95   Temp 37.9 °C (100.2 °F) (Temporal)   Resp 20   Ht 1.549 m (5' 1\")   Wt 50.3 kg (110 lb 14.3 oz)   SpO2 92%   No intake or output data in the 24 hours ending 02/01/22 0747    General: Well developed, well nourished,  female, appears stated age, appears to be in no acute distress.  HEENT: Normocephalic, atraumatic. EOM grossly intact, PERRLA. Mucous membranes moist. No lymphadenopathy.  Cardio: Normal S1 and S2. Regular " rate and rhythm. No murmurs, rubs, or gallops.  Pulmonary: Lungs have bilateral coarse rales throughout all fields.  Abdomen: Normoactive bowel sounds. Abdomen is soft and nondistended. No masses. No tenderness to palpation in all four quadrants.   MSK: Normal ROM. Extremities well-perfused. Capillary refill <2 seconds. No LE edema.  Neuro: A&Ox4. CN II-XII grossly intact. Strength and sensation intact throughout. Reflexes 2+ throughout.  Skin: No rash, lesions, or skin ulcers. No jaundice, ecchymoses, or petechiae.   Psych: Appropriate mood and affect.    Lab Results:  Recent Labs     01/31/22 0924 02/01/22 0513   WBC 9.4 8.9   RBC 4.20 3.98*   HEMOGLOBIN 13.0 12.2   HEMATOCRIT 41.9 39.2   MCV 99.8* 98.5*   MCH 31.0 30.7   RDW 50.6* 49.6   PLATELETCT 181 157*   MPV 9.8 9.9   NEUTSPOLYS 80.00*  --    LYMPHOCYTES 9.80*  --    MONOCYTES 7.90  --    EOSINOPHILS 1.50  --    BASOPHILS 0.40  --      Recent Labs     01/31/22 0924 02/01/22 0513   SODIUM 142 140   POTASSIUM 4.0 4.2   CHLORIDE 107 100   CO2 26 28   BUN 10 19   CREATININE 0.44* 1.07   CALCIUM 7.7* 8.0*   MAGNESIUM 1.9  --    ALBUMIN 3.4 3.5     Estimated GFR/CRCL = Estimated Creatinine Clearance: 30.1 mL/min (by C-G formula based on SCr of 1.07 mg/dL).  Recent Labs     01/31/22 0924 02/01/22 0513   GLUCOSE 91 79     Recent Labs     01/31/22 0924 02/01/22 0513   ASTSGOT 31 53*   ALTSGPT 46 57*   TBILIRUBIN 0.9 1.6*   ALKPHOSPHAT 122* 130*   GLOBULIN 2.3 1.9   INR 1.21*  --              Recent Labs     01/31/22 0924   INR 1.21*   APTT 35.0       Microbiology Results:  Results     Procedure Component Value Units Date/Time    COV-2, FLU A/B, AND RSV BY PCR (2-4 HOURS CEPHEID): Collect NP swab in [facility] [[id number]] Collected: 01/31/22 1100    Order Status: Completed Specimen: Respirate Updated: 01/31/22 1155     Influenza virus A RNA Negative     Influenza virus B, PCR Negative     RSV, PCR Negative     SARS-CoV-2 by PCR NotDetected     Comment: PATIENTS:  "Important information regarding your results and instructions can  be found at [email or web address]   \"After your  Covid-19 Test\"    [facility] providers: PLEASE REFER TO DE-ESCALATION AND RETESTING PROTOCOL  on [email or web address]    **The [email or web address] GeneXpert Xpress SARS-CoV-2 RT-PCR Test has been made  available for use under the Emergency Use Authorization (EUA) only.          SARS-CoV-2 Source NP Swab          Imaging Results:  DX-SHOULDER 2+ RIGHT   Final Result      1.  No fracture or dislocation. No significant osteoarthrosis.   2.  Ill-defined pulmonary opacities in the right lung.      FQ-MRDUPIW-3 VIEW   Final Result      Nonobstructive bowel gas pattern. Postsurgical changes with multiple surgical clips projecting within the abdomen and pelvis.      EC-ECHOCARDIOGRAM COMPLETE W/O CONT   Final Result      CT-CTA CHEST PULMONARY ARTERY W/ RECONS   Final Result      1.  Somewhat limited exam secondary to respiratory motion. No pulmonary embolus is identified      2.  Bilateral patchy reticulation and bronchiectasis in the periphery of the lungs. Groundglass density is most pronounced in the lower lobes. There are some patchy opacities in the left upper lobe. Findings are consistent with chronic pulmonary    fibrosis. Superimposed infection may be present. If clinically indicated, follow-up imaging is recommended to ensure resolution of left upper lobe consolidation.      3.  Small bilateral pleural effusions layer posteriorly.      4.  Nonspecific hilar and mediastinal adenopathy      5.  Cardiomegaly and severe atherosclerosis            DX-CHEST-PORTABLE (1 VIEW)   Final Result      Diffuse interstitial opacities and ill-defined opacities within the lung bases which could be related to the history of pulmonary fibrosis although superimposed edema or infection cannot be excluded. Covid 19 should be excluded.          EKG  Results for orders placed or performed during the hospital " encounter of 22   EKG   Result Value Ref Range    Report       [facility] Emergency Dept[facility]    Test Date:  2022  Pt Name:    MYRA LAGUNA                 Department: ER  MRN:        [id number]                      Room:       BL 17  Gender:     Female                       Technician: [id number]  :        [id number]                   Requested By:ER TRIAGE PROTOCOL  Order #:    [id number]                    Reading MD:    Measurements  Intervals                                Axis  Rate:       114                          P:  MS:                                      QRS:        22  QRSD:       90                           T:          -8  QT:         344  QTc:        474    Interpretive Statements  ATRIAL FIBRILLATION, V-RATE    BORDERLINE T ABNORMALITIES, INFERIOR LEADS  No previous ECG available for comparison     EKG   Result Value Ref Range    Report       [facility]    Test Date:  2022  Pt Name:    MYRA LAGUNA                 Department: 183  MRN:        [id number]                      Room:       T834  Gender:     Female                       Technician: [id number]  :        1940                   Requested By:AMRIT GASTON  Order #:    [id number]                    Reading MD:    Measurements  Intervals                                Axis  Rate:       91                           P:  MS:                                      QRS:        38  QRSD:       91                           T:          -18  QT:         342  QTc:        421    Interpretive Statements  ATRIAL FIBRILLATION  NONSPECIFIC REPOL ABNORMALITY, DIFFUSE LEADS  Compared to ECG 2022 18:08:32  Early repolarization now present  T-wave abnormality no longer present         Current Medications    Current Facility-Administered Medications:   •  senna-docusate (PERICOLACE or SENOKOT S) 8.6-50 MG per tablet 2 Tablet, 2 Tablet, Oral, BID, 2 Tablet at 22 0550 **AND** polyethylene glycol/lytes  (MIRALAX) PACKET 1 Packet, 1 Packet, Oral, QDAY PRN **AND** magnesium hydroxide (MILK OF MAGNESIA) suspension 30 mL, 30 mL, Oral, QDAY PRN **AND** bisacodyl (DULCOLAX) suppository 10 mg, 10 mg, Rectal, QDAY PRN, Valerie Garcia M.D.  •  Respiratory Therapy Consult, , Nebulization, Continuous RT, Valerie Garcia M.D.  •  metoprolol tartrate (LOPRESSOR) tablet 25 mg, 25 mg, Oral, TWICE DAILY, Valerie Garcia M.D., 25 mg at 02/01/22 0555  •  acetaminophen (Tylenol) tablet 650 mg, 650 mg, Oral, Q6HRS PRN, Valerie Garcia M.D., 650 mg at 02/01/22 0603  •  apixaban (ELIQUIS) tablet 2.5 mg, 2.5 mg, Oral, BID, Valerie Garcia M.D., 2.5 mg at 02/01/22 0555  •  ALPRAZolam (XANAX) tablet 0.25 mg, 0.25 mg, Oral, TID PRN, Valerie Garcia M.D., 0.25 mg at 02/01/22 0255  •  tizanidine (ZANAFLEX) tablet 4 mg, 4 mg, Oral, Q12HRS PRN, Valerie Garcia M.D., 4 mg at 02/01/22 0603  •  atorvastatin (LIPITOR) tablet 40 mg, 40 mg, Oral, Nightly, Valerie Garcia M.D., 40 mg at 01/31/22 2229  •  budesonide-formoterol (SYMBICORT) 160-4.5 MCG/ACT inhaler 2 Puff, 2 Puff, Inhalation, BID, Valerie Garcia M.D., 2 Puff at 02/01/22 0550  •  ipratropium-albuterol (DUONEB) nebulizer solution, 3 mL, Nebulization, Q6HRS PRN, Valerie Garcia M.D.  •  furosemide (LASIX) injection 40 mg, 40 mg, Intravenous, Once, Valerie Garcia M.D.  •  DILTIAZem (CARDIZEM) injection 10 mg, 10 mg, Intravenous, Q HOUR PRN, Valerie Garcia M.D.  •  melatonin tablet 5 mg, 5 mg, Oral, QHS, Marisabel Brewster M.D., 5 mg at 01/31/22 2347  •  [COMPLETED] digoxin (Lanoxin) injection 500 mcg, 500 mcg, Intravenous, Once, 500 mcg at 01/31/22 2350 **FOLLOWED BY** [COMPLETED] digoxin (Lanoxin) injection 250 mcg, 250 mcg, Intravenous, Once, 250 mcg at 02/01/22 0550 **FOLLOWED BY** digoxin (Lanoxin) injection 125 mcg, 125 mcg, Intravenous, Once **FOLLOWED BY** [START ON 2/2/2022] digoxin (LANOXIN) tablet 125 mcg, 125 mcg, Oral, Rikki MELTON  SANTHOSH Lopez.    ASSESSMENT/PLAN  Khushi Ojeda is a 81 y.o. female with a PMH of interstitial lung disease, hypertension who was admitted on 1/31/2022 with atrial fibrillation with RVR. The patient has an increased oxygen requirement from her baseline 2L of oxygen to now requiring 6L to maintain oxygen saturation.     Atrial fibrillation with Rapid Ventricular Rate  • Heart rate 131 on presentation to ED  • [id number] =3  • Troponin 18, BNP 1864, INR 1.2  • Patient treated with diltiazem pushes in ED which reduced rate to 80-90  • Patient was not on anticoagulation at home and was started on apixaban 2.5 mg  • Echocardiogram results showed a left ventricular EF of 55%, moderate mitral and tricuspid regurgitation, bilateral atrial enlargement, and a 2.3cm calcified mass in the LVOT, notably no thrombus in the atrial appendage  • Cardiology consulted and switched from diltiazem to digoxin which has controlled the patient's HR well  • Will await cardiology consult for advice on possible cardioversion    Acute on chronic respiratory failure with hypoxia  • Likely due to chronic ILD, fluid overload and possible superimposed infection  • Increasing temperature  • 24 hour COVID test ordered  • Maintain oxygen saturation >90%    Interstitial Lung Disease  • Bibasilar infiltrates on CT  • Continue Symbicort BID and DuoNeb PRN  • Maintain oxygen saturation >90%        PROPHYLAXIS  • DVT: Apixaban  • Other: DNR/DNI

## 2022-02-01 NOTE — PROGRESS NOTES
Admit for Afib with RVR and acute resp failure at 2300, report received from Melanie YOUNG. Placed on tele, monitors notified, Afib 128. Pt alert and oriented. Tachycardic up to 150s, scheduled digoxin administered, HR down to 90-100s. Hypotensive with adequate MAP, otherwise VSS on 4L NC. Reports R shoulder pain, PRN tylenol administered. Xanax for anxiety. Denies nausea. Up with 1, FWW. Shift POC discussed with pt, all questions answered. Call light in reach, fall precautions in place.

## 2022-02-02 NOTE — PROGRESS NOTES
Attempted to give PM oral medication. Patient started coughing and choking. Made NPO. Grandson at bedside during event and educated, agreeable. Will continue to monitor.    rolling walker

## 2022-02-02 NOTE — THERAPY
"Physical Therapy   Initial Evaluation     Patient Name: Khushi Ojeda  Age:  81 y.o., Sex:  female  Medical Record #: [id number]  Today's Date: 2/1/2022     Precautions  Precautions: Fall Risk  Comments: anxiety    Assessment  Patient is 81 y.o. female admitted for Afib RVR and acute respiratory failure with increasing R shoulder pain, found to be fluid overloaded. PMHx of anxiety taking xanax, uterine CA s/p hysterectomy, breast CA s/p mastectomy, and pulmonary fibrosis? Pt very anxious throughout entire session feeling as though she could not catch her breath, however, O2 sats >90% when not holding breath. Pt required ModA to sit EOB and Emily to transfer to [facility]. Recommend placement as pt lives alone. Will continue to follow.     Plan    Recommend Physical Therapy 3 times per week until therapy goals are met for the following treatments:  Bed Mobility, Equipment, Gait Training, Manual Therapy, Neuro Re-Education / Balance, Self Care/Home Evaluation, Stair Training, Therapeutic Activities and Therapeutic Exercises    DC Equipment Recommendations: Unable to determine at this time  Discharge Recommendations: Recommend post-acute placement for additional physical therapy services prior to discharge home       Subjective    \"I can't talk.\"      Objective     02/01/22 1612   Total Time Spent   Total Time Spent (Mins) 42   Charge Group   PT Evaluation PT Evaluation Mod   PT Self Care / Home Evaluation  1  (15 min edu)   Initial Contact Note    Initial Contact Note Order Received and Verified, Physical Therapy Evaluation in Progress with Full Report to Follow.   Precautions   Precautions Fall Risk   Comments anxiety   Vitals   O2 (LPM) 6   O2 Delivery Device Nasal Cannula   Vitals Comments Pt O2 sats >90%, however, pt having a panic attack and difficulty catching her breath, holding her breath and dropping SpO2 to as low as 82%. O2 retured back to >90% with focus on breathing and calming nerves   Pain 0 - 10 Group   Therapist " Pain Assessment Post Activity Pain Same as Prior to Activity;Nurse Notified  (c/o L shoulder pain not rated, agreeable to mobility)   Prior Living Situation   Housing / Facility 1 Story House   Equipment Owned Front-Wheel Walker   Lives with - Patient's Self Care Capacity Alone and Able to Care For Self   Comments Difficulty obtaining home set up given pt's anxiety. States she has a granddaughter who checks on her every so often, unable to discern if thats once a week or once a month   Prior Level of Functional Mobility   Comments Pt stating she was independent with mobility prior, was not very forthcoming with information   Cognition    Cognition / Consciousness X   Level of Consciousness Alert   New Learning Impaired   Attention Impaired   Comments Pt pleasant and cooperative. Pt very anxious, states history of anxiety for majority of her life, usually takes xanax and helps within 20-30 min. Pt premedicated with xanax 20 min prior to session. Pt also stating melatonin helps her sleep. Pt feeling as though she was unable to breathe and holding her breath. Asked if pt had any coping strategies that work for her, with no response. Worked on diaphragmatic breathing and focusing on calming thoughts with little success.   Passive ROM Lower Body   Passive ROM Lower Body WDL   Active ROM Lower Body    Active ROM Lower Body  WDL   Strength Lower Body   Lower Body Strength  X   Gross Strength Generalized Weakness, Equal Bilaterally   Strength Upper Body   Upper Body Strength  X   Gross Strength Generalized Weakness, Equal Bilaterally.    Coordination Upper Body   Coordination WDL   Coordination Lower Body    Coordination Lower Body  WDL   Balance Assessment   Sitting Balance (Static) Fair   Sitting Balance (Dynamic) Fair -   Standing Balance (Static) Poor +   Standing Balance (Dynamic) Poor +   Weight Shift Sitting Fair   Weight Shift Standing Fair   Comments via HHA   Gait Analysis   Gait Level Of Assist Minimal Assist    Assistive Device Hand Held Assist   Distance (Feet) 5   # of Times Distance was Traveled 1   Deviation Step To;Bradykinetic;Decreased Base Of Support   Weight Bearing Status no restrictions   Bed Mobility    Supine to Sit Moderate Assist   Sit to Supine Supervised   Scooting Supervised   Rolling Minimum Assist to Lt.   Comments HOB raised   Functional Mobility   Sit to Stand Minimal Assist   Bed, Chair, Wheelchair Transfer Minimal Assist   Toilet Transfers Minimal Assist   Transfer Method Stand Step   Mobility w/ HHA bed <> BSC   How much difficulty does the patient currently have...   Turning over in bed (including adjusting bedclothes, sheets and blankets)? 1   Sitting down on and standing up from a chair with arms (e.g., wheelchair, bedside commode, etc.) 1   Moving from lying on back to sitting on the side of the bed? 1   How much help from another person does the patient currently need...   Moving to and from a bed to a chair (including a wheelchair)? 3   Need to walk in a hospital room? 3   Climbing 3-5 steps with a railing? 2   6 clicks Mobility Score 11   Activity Tolerance   Sitting in Chair 5 min BSC   Sitting Edge of Bed 8 min total   Standing 1-2 min total   Edema / Skin Assessment   Edema / Skin  X   Comments mepilex on sacrum   Patient / Family Goals    Patient / Family Goal #1 To get better   Short Term Goals    Short Term Goal # 1 Pt will perform supine <> sit with SPV in 6 visits to improve functional independence   Short Term Goal # 2 Pt will transfer to chair with FWW and SPV in 6 visits to initiate OOB mobility   Short Term Goal # 3 Pt will ambulate 50ft with FWW and SPV in 6 visits to initiate short distance ambulation   Education Group   Education Provided Role of Physical Therapist   Role of Physical Therapist Patient Response Patient;Acceptance;Explanation;Action Demonstration   Additional Comments Extensive conversation with pt regarding breathing techniques, pursed lip breathing,  diaphragmatic breathing, calming techiques, positive reinforcement, discussing history of anxiety and what has helped in the past, family support, and importance of OOB mobility   Problem List    Problems Pain;Impaired Bed Mobility;Impaired Transfers;Impaired Ambulation;Functional Strength Deficit;Impaired Balance;Decreased Activity Tolerance   Anticipated Discharge Equipment and Recommendations   DC Equipment Recommendations Unable to determine at this time   Discharge Recommendations Recommend post-acute placement for additional physical therapy services prior to discharge home   Interdisciplinary Plan of Care Collaboration   IDT Collaboration with  Nursing   Patient Position at End of Therapy In Bed;Call Light within Reach;Tray Table within Reach;Phone within Reach   Collaboration Comments RN updated   Session Information   Date / Session Number  2/1 1 (1/3, 2/7)

## 2022-02-02 NOTE — PROGRESS NOTES
Cardiology Follow Up Progress Note    Date of Service  2/2/2022    Attending Physician  CAROLYN Ayoub M.D.    Chief Complaint   CHF    HPI  Khushi Ojeda is a 81 y.o. female admitted 1/31/2022 with CHF    Interim Events  Obtunded  Continues with Cr increasing  No significant response to IV diuretics  BP stable    Review of Systems  Review of Systems   Unable to perform ROS: Mental status change       Vital signs in last 24 hours  Temp:  [36.3 °C (97.3 °F)-37.1 °C (98.8 °F)] 36.8 °C (98.3 °F)  Pulse:  [] 114  Resp:  [20-38] 22  BP: ()/(35-69) 104/57  SpO2:  [92 %-97 %] 95 %    Physical Exam  Physical Exam  Vitals and nursing note reviewed.   Constitutional:       General: She is not in acute distress.     Appearance: Normal appearance. She is normal weight. She is not ill-appearing, toxic-appearing or diaphoretic.   HENT:      Head: Normocephalic and atraumatic.      Right Ear: Ear canal and external ear normal.      Left Ear: Ear canal and external ear normal.      Nose: Nose normal. No congestion or rhinorrhea.      Mouth/Throat:      Mouth: Mucous membranes are moist.      Pharynx: Oropharynx is clear. No oropharyngeal exudate or posterior oropharyngeal erythema.   Eyes:      General: No scleral icterus.        Right eye: No discharge.         Left eye: No discharge.      Extraocular Movements: Extraocular movements intact.      Conjunctiva/sclera: Conjunctivae normal.      Pupils: Pupils are equal, round, and reactive to light.   Neck:      Vascular: No carotid bruit.   Cardiovascular:      Rate and Rhythm: Normal rate and regular rhythm.      Pulses: Normal pulses.      Heart sounds: Normal heart sounds. No murmur heard.  No gallop.    Pulmonary:      Effort: Pulmonary effort is normal. No respiratory distress.      Breath sounds: Normal breath sounds. No stridor. No wheezing, rhonchi or rales.   Abdominal:      General: Abdomen is flat. Bowel sounds are normal. There is no distension.       Palpations: Abdomen is soft. There is no mass.      Tenderness: There is no abdominal tenderness. There is no guarding or rebound.      Hernia: No hernia is present.   Musculoskeletal:         General: No swelling or tenderness. Normal range of motion.      Cervical back: Normal range of motion and neck supple. No rigidity. No muscular tenderness.      Right lower leg: No edema.      Left lower leg: No edema.   Lymphadenopathy:      Cervical: No cervical adenopathy.   Skin:     General: Skin is warm and dry.      Capillary Refill: Capillary refill takes 2 to 3 seconds.      Coloration: Skin is not jaundiced or pale.      Findings: No bruising or lesion.   Neurological:      General: No focal deficit present.      Mental Status: She is alert and oriented to person, place, and time. Mental status is at baseline.      Cranial Nerves: No cranial nerve deficit.      Motor: No weakness.   Psychiatric:         Mood and Affect: Mood normal.         Behavior: Behavior normal.         Thought Content: Thought content normal.         Judgment: Judgment normal.         Lab Review  Lab Results   Component Value Date/Time    WBC 17.2 (H) 02/02/2022 04:40 AM    RBC 3.81 (L) 02/02/2022 04:40 AM    HEMOGLOBIN 11.8 (L) 02/02/2022 04:40 AM    HEMATOCRIT 39.0 02/02/2022 04:40 AM    .4 (H) 02/02/2022 04:40 AM    MCH 31.0 02/02/2022 04:40 AM    MCHC 30.3 (L) 02/02/2022 04:40 AM    MPV 11.5 02/02/2022 04:40 AM      Lab Results   Component Value Date/Time    SODIUM 140 02/02/2022 04:40 AM    POTASSIUM 4.7 02/02/2022 04:40 AM    CHLORIDE 99 02/02/2022 04:40 AM    CO2 29 02/02/2022 04:40 AM    GLUCOSE 83 02/02/2022 04:40 AM    BUN 35 (H) 02/02/2022 04:40 AM    CREATININE 1.96 (H) 02/02/2022 04:40 AM      Lab Results   Component Value Date/Time    ASTSGOT 180 (H) 02/02/2022 04:40 AM    ALTSGPT 137 (H) 02/02/2022 04:40 AM     Lab Results   Component Value Date/Time    TROPONINT 18 01/31/2022 09:24 AM       Recent Labs     01/31/22  0924  02/01/22 1939   NTPROBNP 1864* 53841*       Cardiac Imaging and Procedures Review  Chest X-Ray: 1/31/2022  Diffuse interstitial opacities and ill-defined opacities within the lung bases which could be related to the history of pulmonary fibrosis although superimposed edema or infection cannot be excluded. Covid 19 should be excluded      CHEST CTA 1/31/2022  1.  Somewhat limited exam secondary to respiratory motion. No pulmonary embolus is identified  2.  Bilateral patchy reticulation and bronchiectasis in the periphery of the lungs. Groundglass density is most pronounced in the lower lobes. There are some patchy opacities in the left upper lobe. Findings are consistent with chronic pulmonary   fibrosis. Superimposed infection may be present. If clinically indicated, follow-up imaging is recommended to ensure resolution of left upper lobe consolidation.  3.  Small bilateral pleural effusions layer posteriorly.  4.  Nonspecific hilar and mediastinal adenopathy  5.  Cardiomegaly and severe atherosclerosis    Echo: Dated 2/1/2021 personally viewed inter myself showing normal LV systolic function with an EF of 55%.    Assessment/Plan  No new Assessment & Plan notes have been filed under this hospital service since the last note was generated.  Service: Cardiology  81-year-old female with fluid overload in the setting of diastolic dysfunction.  I would continue to diurese her.  I placed an order for eighty IV Lasix.  If she should decide to go on comfort care obviously we can hold this.  Otherwise I think that her increasing creatinine is secondary to cardiorenal syndrome.  I would not withhold diuretics in the setting.    Thank you for allowing me to participate in the care of this patient.  Cardiology will sign off on this patient    Please contact me with any questions.    Antonio Tavera M.D.   Cardiologist, [facility]  [phone]

## 2022-02-02 NOTE — PROGRESS NOTES
Received page around 9:20 pm that patient is hypotensive with BP in 70/44. Repeat BP remained same.   MAP 50-55. Patient was evaluated at bedside.   HR stable in 80-90, now requiring 10-12 L of O2 ( SPo2 94-95%) .Appeared lethargic but able to communicate , oriented x 3. Denies any dizziness or chest pain.Complaining of right shoulder pain 9/10.  On examination bilateral diffuse crackles ( ILD and pulmonary edema) , elevated JVP. 1+ pedal edema.  Acute exacerbation of CHF - fluids is not the choice and midodrine is relative contraindication. Given patient is fluid overloaded possibly will benefit with a dose of lasix but with concern that if BP does not improve, she might require pressors support,I discussed with patient and Grandson who initially states that they are ok to for ICU transfer. I Consulted ICU  for recommendation and evaluation for potential ICU transfer if needed pressor support.She recommended giving the lasix dose and start IV solumedrol  per pulmonology recommendations given patient is requiring more O2.  Appreciate  seeing the patient bedside.   Bedside cardiac US confirmed fluid overload with possibly EF lower than 55. Patient was in lot of pain in right shoulder during bedside evaluation. She is currently NPO due to concern for aspiration. Received rectal tylenol without much help. Tried requesting pharmacy for IV tylenol as Toradol not the choice with current TOO and morphine can further drop the BP. As per pharmacy IV tylenol is not approved unless post op. I added topical analgesic and after discussing with patient ,ativan 0.25 mg was given to help with anxiety which actually helped to clam her down.  had a long discussion with patient and grandson who eventually decided not to go through any procedures ( central line) or ICU transfer.  We will continue to monitor vitals, If despite of giving lasix  BP remains low, patient wants to transition to comfort care measures ,  if symptoms/BP improves then she wants to continue current treatment. Family in agreement . As per chart review plans to have palliative involved and have hospice discussion in the am.    Plan:  Stat CXR-Pulmonary edema and/or infiltrates are identified, which appear somewhat increased since the prior exam.Stat  labs showed mildly elevated lactic acid of 2.3 , elevated  Cr. ( 0.4--->1.07---> 1.81)   1 time ativan 0.25 mg given to help with anxiety   IV 40 mg lasix , strict I and Os  IV solumedrol 125 mg Q6 hr  Pain management with topical analgesic,rectal tylenol Q4hr prn  If needed can try dilaudid 0.5 mg with benadryl( patient reports allergy to morphine-?itching)

## 2022-02-02 NOTE — CARE PLAN
The patient is Unstable - High likelihood or risk of patient condition declining or worsening    Shift Goals  Clinical Goals: optimizie oxygenation  Patient Goals: sleep, pain mgt    Progress made toward(s) clinical / shift goals:    Problem: Knowledge Deficit - Standard  Goal: Patient and family/care givers will demonstrate understanding of plan of care, disease process/condition, diagnostic tests and medications  Outcome: Progressing  Note: Discuss and review POC with patient and grandson. Re-educate as needed.       Problem: Fall Risk  Goal: Patient will remain free from falls  Outcome: Progressing  Note: Treaded socks and bed/strip alarm on, side rails up x 3. Call light within reach. Pt educated to call for assistance. Reinforce as needed. Continue to monitor.           Patient is not progressing towards the following goals:      Problem: Pain - Standard  Goal: Alleviation of pain or a reduction in pain to the patient’s comfort goal  Outcome: Not Progressing  Flowsheets  Taken 2/2/2022 0059  OB Pain Intervention:   Heat applied   Medication - See MAR  Taken 2/1/2022 2108  Pain Rating Scale (NPRS): 8  Note: Assess and monitor for pain. Provide pharmacological and non-pharmacological interventions as appropriate. Persistent right shoulder pain, per grandson has had for years. Re-evaluate and continue to monitor.

## 2022-02-02 NOTE — DISCHARGE PLANNING
Received Choice form at 8720  Agency/Facility Name: [facility]  Referral sent per Choice form @ 2642

## 2022-02-02 NOTE — HEART FAILURE PROGRAM
Per Resident note yesterday, HFpEF confirmed. Patient has transitioned to comfort care.    Thank you, Carolina Cardio RN Navigator [phone]

## 2022-02-02 NOTE — DISCHARGE PLANNING
Anticipated Discharge Disposition: Home with hospice    Action: LSW attempted to meet with pt at bedside to complete assessment and obtain SNF choice. Pt currently sleeping and unable to wake at this time.    Addendum @7560  Pt now comfort care. LSW attempted to meet with pt at bedside, however pt not able to participate with this LSW. LSW made phone call to pt's granddaughter Melly. Melly reported their goal is to get the pt home with hospice. Melly confirmed the address on the face sheet and reported she will be staying with the pt at her home. There are 3 steps into the home. Melly requested the hospice referral be sent to [facility]. LSW faxed choice form to [facility]. LSW made phone call to Hitesh with [facility] who confirmed they service [place]ie and will be on the lookout for the referral.    Addendum @0848  LSW received phone call from Lisa with [facility] who reported they have accepted this pt. They have a meeting scheduled to meet with the pt's dtr tomorrow between 2803-0114. Lisa stated they will have their liaison call the dtr in the morning to see if they can get DME delivered to their home sooner than that.    Barriers to Discharge: Coordination with hospice for DME and transportation home.    Plan: Care coordination will follow up with [facility].      [facility] #[id number]

## 2022-02-02 NOTE — DISCHARGE PLANNING
Anticipated Discharge Disposition: HH vs SNF    Action: pt pending medical clearance, pt currently on 3 liters O2, uses 2 at baseline, 6 clicks are currently 11, likely to improve. Pt likely to need SNF or HH.     Barriers to Discharge: medical clearance    Plan: f/u with medical team and pt to discuss dc needs and barriers.

## 2022-02-02 NOTE — PROGRESS NOTES
Pt very anxious, tachypneic, tachycardic and increased WOB when speaking. IV ativan given.This RN discussed GOC with pt, focus on keeping her comfortable vs treating her illnesses. Pt expressed wanting to be kept comfortable. Palliative RN Lisa contacted regarding consult. She informed this RN they are unable to consult for 2 days d/t high volume of consults at this time. Lisa recommended speaking to medical team regarding comfort care now given pt is AOx4, well informed on GOC and treatment options and expressing comfort as goal. This RN called and Voalted Dr [facility], awaiting call back.

## 2022-02-02 NOTE — THERAPY
Occupational Therapy Contact Note:     02/02/22 8252   Interdisciplinary Plan of Care Collaboration   Collaboration Comments Pt has transitioned to comfort care. Will DC OT orders at this time, please re-order if POC changes.       Nevin Billings, OTR/L

## 2022-02-02 NOTE — CONSULTS
Pulmonary Consultation    Date of Service: 2/1/2022    Date of Admission:  1/31/2022  9:44 AM    Consulting Physician: CAROLYN Ayoub M.D.    Chief Complaint:  Shoulder Pain (R shoulder) and Shortness of Breath (hx pulmonary fibrosis)      History of Present Illness: Khushi Ojeda is a 81 y.o. female with a past medical history of breast cancer status post chemoradiation and mastectomy in 1987, uterine cancer status post hysterectomy 1983, cholecystectomy, atrial fibrillation, former smoker who quit 1998, history of interstitial lung disease diagnosed 1/2 years ago, chronic hypoxic respiratory failure is on 2 L of supplemental oxygen at home who follows with pulmonology at [facility], underlying anxiety who presented with complaints of worsening dyspnea and right shoulder pain.    On admission the patient was in atrial fibrillation with RVR requiring 3 to 2- 4 L of supplemental oxygen.  She tested negative for COVID-19.  BNP was elevated to 1864.  Chest x-ray consistent diffuse interstitial ill-defined opacities.  Patient underwent CT chest with evidence of small bilateral pleural effusions, peripheral reticulation, groundglass opacities and honeycombing in the bases of lungs.  Enlarged right atrium was seen.  Additional finding of mitral and aortic valve calcifications were also seen.  Patient was admitted inpatient under telemetry monitoring with initiation of Lasix, prednisone and duo nebs.  Pulmonology was consulted for acute on chronic worsening hypoxic respiratory failure in the setting of interstitial lung disease.    On examination today the patient was anxious and hypoxic to 85% while eating her meal.  She reported shortness of breath which is worsened in the last few days.  She remained anxious throughout the interview finding it hard to complete sentences. The patient further added that she follows with Pulmonology at [facility] and was diagnosed with Interstitial Lung Disease.      Review  "of Systems   Constitutional: Positive for malaise/fatigue. Negative for chills and fever.   Respiratory: Positive for cough and shortness of breath. Negative for hemoptysis and sputum production.    Cardiovascular: Negative for chest pain and palpitations.   Gastrointestinal: Negative for heartburn, nausea and vomiting.   Neurological: Negative for dizziness and headaches.       Home Medications     Reviewed by Amando Becker (Pharmacy Tech) on 22 at 1224  Med List Status: Complete   Medication Last Dose Status   ALPRAZolam (XANAX) 0.25 MG Tab 2022 Active   atorvastatin (LIPITOR) 40 MG Tab 2022 Active   clopidogrel (PLAVIX) 75 MG Tab 2022 Active   Fluticasone Furoate-Vilanterol (BREO ELLIPTA) 100-25 MCG/INH AEROSOL POWDER, BREATH ACTIVATED 2022 Active   ipratropium-albuterol (DUONEB) 0.5-2.5 (3) MG/3ML nebulizer solution unknown Active   metoprolol tartrate (LOPRESSOR) 25 MG Tab 2022 Active                Social History     Tobacco Use   • Smoking status: Former Smoker     Quit date: 1940     Years since quittin.1   • Smokeless tobacco: Never Used   Substance Use Topics   • Alcohol use: Never   • Drug use: Never        Past Medical History:   Diagnosis Date   • Cancer (HCC)     breast cancer   • Cancer (HCC)     uterine   • Pulmonary fibrosis (HCC)        Past Surgical History:   Procedure Laterality Date   • MASTECTOMY Right    • GYN SURGERY      partial hysterectomy       Allergies: Codeine and Penicillins    History reviewed. No pertinent family history.    Vitals:    22 0853 22 0907 22 1029 22 1047   Height: 1.549 m (5' 1\")      Weight:  50.3 kg (110 lb 14.3 oz)     Weight % change since last entry.:  0 %     BP: 151/87  114/78    Pulse: (!) 125  (!) 125 89   Resp: 18  18 19   Temp: 36.3 °C (97.4 °F)      TempSrc: Temporal       22 1049 22 1100 22 1200 22 1300   Height:       Weight:       Weight % change since last " entry.:       BP:  117/57 122/55 121/60   Pulse: 91 96 (!) 125 (!) 113   Resp: 19 18 18 20   Temp:       TempSrc:        01/31/22 1732 01/31/22 1733 01/31/22 1734 01/31/22 1735   Height:       Weight:       Weight % change since last entry.:       BP:       Pulse: (!) 110 (!) 113 (!) 115 (!) 119   Resp:       Temp:       TempSrc:        01/31/22 1829 01/31/22 2000 01/31/22 2100 01/31/22 2128   Height:       Weight:       Weight % change since last entry.:       BP: 124/55 147/62 (!) 86/48 102/57   Pulse: (!) 119 (!) 113 (!) 126 (!) 115   Resp: 18 20 20 18   Temp:       TempSrc:        01/31/22 2129 01/31/22 2200 01/31/22 2312 01/31/22 2350   Height:       Weight:       Weight % change since last entry.:       BP: 105/55 106/54 (!) 92/61 108/59   Pulse: (!) 139 (!) 118 (!) 128 (!) 131   Resp: 20 (!) 21 20    Temp:   36.7 °C (98 °F)    TempSrc:   Temporal     02/01/22 0100 02/01/22 0400 02/01/22 0550 02/01/22 0555   Height:       Weight:       Weight % change since last entry.:       BP: 102/46 (!) 93/43 (!) 93/54 (!) 93/54   Pulse: 96 (!) 103 (!) 113 (!) 113   Resp:  (!) 22     Temp:  36.8 °C (98.2 °F)     TempSrc:  Temporal      02/01/22 0745 02/01/22 1148 02/01/22 1300 02/01/22 1451   Height:       Weight:       Weight % change since last entry.:       BP: (!) 81/36 (!) 75/57 (!) 82/40 129/69   Pulse: 95 74     Resp: 20 20     Temp: 37.9 °C (100.2 °F) 36.6 °C (97.9 °F)     TempSrc: Temporal Temporal      02/01/22 1627   Height:    Weight:    Weight % change since last entry.:    BP: 107/62   Pulse: 92   Resp: 20   Temp: 36.8 °C (98.3 °F)   TempSrc: Temporal       Physical Examination  General: Eldelry thin frail woman sitting up, looks ill and distressed  Neuro/Psych: AOX3, anxious from underlying air hunger  HEENT: Extraocular movements intact  CVS: S1, S2 heard, irregular rhythm with murmur  Respiratory: Using accessory muscles to breathe.  Difficulty completing a full sentence  Abdomen/: Soft nontender  nondistended.  Extremities: Edema bilaterally  Skin: Warm dry      Intake/Output Summary (Last 24 hours) at 2/1/2022 1723  Last data filed at 2/1/2022 0930  Gross per 24 hour   Intake 240 ml   Output --   Net 240 ml       Recent Labs     01/31/22  0924 02/01/22  0513   WBC 9.4 8.9   NEUTSPOLYS 80.00*  --    LYMPHOCYTES 9.80*  --    MONOCYTES 7.90  --    EOSINOPHILS 1.50  --    BASOPHILS 0.40  --    ASTSGOT 31 53*   ALTSGPT 46 57*   ALKPHOSPHAT 122* 130*   TBILIRUBIN 0.9 1.6*     Recent Labs     01/31/22  0924 02/01/22  0513   SODIUM 142 140   POTASSIUM 4.0 4.2   CHLORIDE 107 100   CO2 26 28   BUN 10 19   CREATININE 0.44* 1.07   MAGNESIUM 1.9  --    CALCIUM 7.7* 8.0*     Recent Labs     01/31/22  0924 02/01/22  0513   ALTSGPT 46 57*   ASTSGOT 31 53*   ALKPHOSPHAT 122* 130*   TBILIRUBIN 0.9 1.6*   GLUCOSE 91 79         DX-SHOULDER 2+ RIGHT   Final Result      1.  No fracture or dislocation. No significant osteoarthrosis.   2.  Ill-defined pulmonary opacities in the right lung.      [id number] VIEW   Final Result      Nonobstructive bowel gas pattern. Postsurgical changes with multiple surgical clips projecting within the abdomen and pelvis.      EC-ECHOCARDIOGRAM COMPLETE W/O CONT   Final Result      CT-CTA CHEST PULMONARY ARTERY W/ RECONS   Final Result      1.  Somewhat limited exam secondary to respiratory motion. No pulmonary embolus is identified      2.  Bilateral patchy reticulation and bronchiectasis in the periphery of the lungs. Groundglass density is most pronounced in the lower lobes. There are some patchy opacities in the left upper lobe. Findings are consistent with chronic pulmonary    fibrosis. Superimposed infection may be present. If clinically indicated, follow-up imaging is recommended to ensure resolution of left upper lobe consolidation.      3.  Small bilateral pleural effusions layer posteriorly.      4.  Nonspecific hilar and mediastinal adenopathy      5.  Cardiomegaly and severe  atherosclerosis            DX-CHEST-PORTABLE (1 VIEW)   Final Result      Diffuse interstitial opacities and ill-defined opacities within the lung bases which could be related to the history of pulmonary fibrosis although superimposed edema or infection cannot be excluded. Covid 19 should be excluded.          Patient Active Problem List   Diagnosis   • Atrial fibrillation (HCC)   • Interstitial lung disease (HCC)   • Hypertension   • Shoulder pain, right   • RUQ pain   • Anxiety   • Acute on chronic respiratory failure with hypoxia (HCC)   • Diastolic heart failure (HCC)   • Pulmonary hypertension (HCC)   • TOO (acute kidney injury) (HCC)       Assessment and Plan:  Acute on chronic hypoxic respiratory failure  Recent admission for A. fib along with RVR, now rate controlled   Heart failure with preserved ejection fraction: Last EF 1/31:  55%.    Dilated left and right atria.  Moderate tricuspid regurgitation  Class 3 Pulmonary Hypertension :RVSP 62   History of interstitial lung disease/pulmonary fibrosis who follows with [facility] pulmonology  Differentials includes volume overload from underlying HFpEF versus infectious process vs progression of underlying interstitial lung disease  Former smoker who quit in 1998  History of breast cancer status post chemoradiation in 1987  Has pets at home including her dog  No chemical exposures  No history of rheumatoid arthritis or lupus  Procal normal  On chronic home oxygen 2 L  CT chest with evidence of honeycombing in the apical region right lung, groundglass opacities, reticular pattern, bilateral pleural effusions, hilar and mediastinal adenopathy with concern for superimposed pneumonia    Plan:  Continue diuresis  Monitor I and Os  Allergic to penicillin  Elevated Digoxin hence avoiding macrolides  Hence Ceftiaxone and Doxycycline.      Helena Suarez M.D.  [facility] Internal Medicine

## 2022-02-02 NOTE — PROGRESS NOTES
"Daily Progress Note:     Date of Service: 2/2/2022  Primary Team: [facility] Team   Attending: CAROLYN Ayoub M.D.   Senior Resident: Dr. Helen Desai   Intern: Dr. Manju Mccain  Contact:  [phone]    Patient ID:  81 year old female with past medical history of breast cancer status postmastectomy and chemo therapy, uterine cancer status post hysterectomy in 1983, cholecystectomy, atrial fibrillation, hypertension, interstitial lung disease diagnosed 1-1/2 years ago, anxiety, right shoulder pain secondary to \" blocked artery to right arm\", admitted on 1/31/22 for atrial fibrillation with RVR, acute on chronic respiratory failure.    Chief Complaint: Shortness of breath, right shoulder pain, generalized weakness    Interval Update/Subjective:   Patient remain hypotensive with systolic in 70s-80s last night with MAP 50-55, night resident contacted and ICU attending Dr. Mahajan assessed patient at bedside, pt appears fluid overloaded and Lasix 40mg IV given, CXR showed pulmonary edema/infiltrates with elevated lactic acid. Discussion held with patient and family regarding possibility of requiring pressors and decision was made for no ICU admissions as not within goals of care for central line placement and invasive procures.     This morning, patient in no respiratory distress, requesting \"I want comfort care\" stating \"I feel anxious, can I have my Xanax.\" Assessed to be alert and oriented x 4. Per nursing, palliative team will not be able to see patient for another 2 days. Decision made to transition patient to comfort care made with patient, medical team, and nurse in room. Patient's family contacted with transition to comfort care, family understands and will be visiting patient later today.     Consultants/Specialty:  Cardiology   Pulmonary   Palliative    Review of Systems:    Review of Systems   Constitutional: Negative for chills and fever.   Respiratory: Negative for cough, sputum production and shortness " of breath.    Cardiovascular: Negative for chest pain, palpitations and leg swelling.   Gastrointestinal: Negative for abdominal pain, nausea and vomiting.   Genitourinary: Negative.    Musculoskeletal: Negative for joint pain and myalgias.   Neurological: Negative for dizziness, tingling, sensory change and headaches.     Objective Data:   Physical Exam:   Vitals:   Temp:  [36.3 °C (97.3 °F)-37.1 °C (98.8 °F)] 36.8 °C (98.3 °F)  Pulse:  [] 114  Resp:  [20-38] 22  BP: ()/(35-69) 104/57  SpO2:  [92 %-97 %] 95 %    Physical Exam  Constitutional:       General: She is not in acute distress.     Comments: somnolent   HENT:      Mouth/Throat:      Mouth: Mucous membranes are dry.   Eyes:      General: No scleral icterus.  Cardiovascular:      Rate and Rhythm: Normal rate. Rhythm irregular.      Heart sounds: Murmur heard.        Comments: JVP up to the angle of the jaw  Pulmonary:      Effort: No respiratory distress (mild).      Comments: Bilateral coarse rales  Abdominal:      General: Abdomen is flat. Bowel sounds are normal.      Palpations: Abdomen is soft.      Tenderness: There is no abdominal tenderness. There is no guarding or rebound.   Musculoskeletal:      Right lower leg: No edema.      Left lower leg: No edema.   Skin:     General: Skin is warm and dry.      Coloration: Skin is not jaundiced.   Neurological:      General: No focal deficit present.      Mental Status: She is oriented to person, place, and time.   Psychiatric:         Mood and Affect: Mood normal.         Behavior: Behavior normal.       Labs:   Recent Results (from the past 24 hour(s))   HIV AG/AB COMBO ASSAY SCREENING    Collection Time: 02/01/22  7:39 PM   Result Value Ref Range    HIV Ag/Ab Combo Assay Non-Reactive Non Reactive   Sed Rate    Collection Time: 02/01/22  7:39 PM   Result Value Ref Range    Sed Rate Westergren 10 0 - 25 mm/hour   CRP QUANTITIVE (NON-CARDIAC)    Collection Time: 02/01/22  7:39 PM   Result Value Ref  Range    Stat C-Reactive Protein 16.70 (H) 0.00 - 0.75 mg/dL   RHEUMATOID ARTHRITIS FACTOR    Collection Time: 22  7:39 PM   Result Value Ref Range    Rheumatoid Factor -Neph- 16 (H) 0 - 14 IU/mL   proBrain Natriuretic Peptide, NT    Collection Time: 22  7:39 PM   Result Value Ref Range    NT-proBNP 41149 (H) 0 - 125 pg/mL   Basic Metabolic Panel    Collection Time: 22 10:50 PM   Result Value Ref Range    Sodium 140 135 - 145 mmol/L    Potassium 4.4 3.6 - 5.5 mmol/L    Chloride 101 96 - 112 mmol/L    Co2 26 20 - 33 mmol/L    Glucose 75 65 - 99 mg/dL    Bun 29 (H) 8 - 22 mg/dL    Creatinine 1.81 (H) 0.50 - 1.40 mg/dL    Calcium 7.9 (L) 8.5 - 10.5 mg/dL    Anion Gap 13.0 7.0 - 16.0   LACTIC ACID    Collection Time: 22 10:50 PM   Result Value Ref Range    Lactic Acid 2.3 (H) 0.5 - 2.0 mmol/L   MAGNESIUM    Collection Time: 22 10:50 PM   Result Value Ref Range    Magnesium 1.8 1.5 - 2.5 mg/dL   CORTISOL    Collection Time: 22 10:50 PM   Result Value Ref Range    Cortisol 86.5 (H) 0.0 - 23.0 ug/dL   ESTIMATED GFR    Collection Time: 22 10:50 PM   Result Value Ref Range    GFR If  32 (A) >60 mL/min/1.73 m 2    GFR If Non  27 (A) >60 mL/min/1.73 m 2   EKG    Collection Time: 22 10:52 PM   Result Value Ref Range    Report       [facility]    Test Date:  2022  Pt Name:    MYRA LAGUNA                 Department: 183  MRN:        [id number]                      Room:       T834  Gender:     Female                       Technician: [id number]  :        [id number]                   Requested By:ELLIE LEON  Order #:    [id number]                    Reading MD: Bo Perry MD    Measurements  Intervals                                Axis  Rate:       87                           P:  UT:                                      QRS:        19  QRSD:       84                           T:          -14  QT:         352  QTc:         424    Interpretive Statements  ATRIAL FIBRILLATION, V-RATE    NONSPECIFIC T ABNORMALITIES, INFERIOR LEADS  ARTIFACT IN LEAD(S) I,II,III,aVR,aVL,aVF,V1,V2,V3,V4,V5,V6  Compared to ECG 02/01/2022 08:35:40  No significant changes  Electronically Signed On 2-2-2022 5:15:56 PST by Bo Perry MD     Prothrombin Time    Collection Time: 02/02/22  4:40 AM   Result Value Ref Range    PT 27.7 (H) 12.0 - 14.6 sec    INR 2.68 (H) 0.87 - 1.13   CBC WITH DIFFERENTIAL    Collection Time: 02/02/22  4:40 AM   Result Value Ref Range    WBC 17.2 (H) 4.8 - 10.8 K/uL    RBC 3.81 (L) 4.20 - 5.40 M/uL    Hemoglobin 11.8 (L) 12.0 - 16.0 g/dL    Hematocrit 39.0 37.0 - 47.0 %    .4 (H) 81.4 - 97.8 fL    MCH 31.0 27.0 - 33.0 pg    MCHC 30.3 (L) 33.6 - 35.0 g/dL    RDW 52.8 (H) 35.9 - 50.0 fL    Platelet Count 110 (L) 164 - 446 K/uL    MPV 11.5 9.0 - 12.9 fL    Neutrophils-Polys 66.10 44.00 - 72.00 %    Lymphocytes 0.00 (L) 22.00 - 41.00 %    Monocytes 1.70 0.00 - 13.40 %    Eosinophils 0.00 0.00 - 6.90 %    Basophils 0.00 0.00 - 1.80 %    Nucleated RBC 0.00 /100 WBC    Neutrophils (Absolute) 13.47 (H) 2.00 - 7.15 K/uL    Lymphs (Absolute) 0.00 (L) 1.00 - 4.80 K/uL    Monos (Absolute) 0.29 0.00 - 0.85 K/uL    Eos (Absolute) 0.00 0.00 - 0.51 K/uL    Baso (Absolute) 0.00 0.00 - 0.12 K/uL    NRBC (Absolute) 0.00 K/uL   Comp Metabolic Panel    Collection Time: 02/02/22  4:40 AM   Result Value Ref Range    Sodium 140 135 - 145 mmol/L    Potassium 4.7 3.6 - 5.5 mmol/L    Chloride 99 96 - 112 mmol/L    Co2 29 20 - 33 mmol/L    Anion Gap 12.0 7.0 - 16.0    Glucose 83 65 - 99 mg/dL    Bun 35 (H) 8 - 22 mg/dL    Creatinine 1.96 (H) 0.50 - 1.40 mg/dL    Calcium 7.9 (L) 8.5 - 10.5 mg/dL    AST(SGOT) 180 (H) 12 - 45 U/L    ALT(SGPT) 137 (H) 2 - 50 U/L    Alkaline Phosphatase 142 (H) 30 - 99 U/L    Total Bilirubin 2.0 (H) 0.1 - 1.5 mg/dL    Albumin 3.2 3.2 - 4.9 g/dL    Total Protein 5.3 (L) 6.0 - 8.2 g/dL    Globulin 2.1 1.9 - 3.5 g/dL     A-G Ratio 1.5 g/dL   DIGOXIN    Collection Time: 02/02/22  4:40 AM   Result Value Ref Range    Digoxin 2.6 (HH) 0.8 - 2.0 ng/mL   ESTIMATED GFR    Collection Time: 02/02/22  4:40 AM   Result Value Ref Range    GFR If  29 (A) >60 mL/min/1.73 m 2    GFR If Non  24 (A) >60 mL/min/1.73 m 2   DIFFERENTIAL MANUAL    Collection Time: 02/02/22  4:40 AM   Result Value Ref Range    Bands-Stabs 12.20 (H) 0.00 - 10.00 %    Metamyelocytes 18.30 %    Myelocytes 1.70 %    Manual Diff Status PERFORMED    PERIPHERAL SMEAR REVIEW    Collection Time: 02/02/22  4:40 AM   Result Value Ref Range    Peripheral Smear Review see below    PLATELET ESTIMATE    Collection Time: 02/02/22  4:40 AM   Result Value Ref Range    Plt Estimation Decreased    MORPHOLOGY    Collection Time: 02/02/22  4:40 AM   Result Value Ref Range    RBC Morphology Normal    LACTIC ACID    Collection Time: 02/02/22  4:41 AM   Result Value Ref Range    Lactic Acid 1.9 0.5 - 2.0 mmol/L       Imaging:   Independant Imaging Review: Completed  DX-CHEST-PORTABLE (1 VIEW)   Final Result         1.  Pulmonary edema and/or infiltrates are identified, which appear somewhat increased since the prior exam.   2.  Trace bilateral pleural effusions   3.  Cardiomegaly   4.  Atherosclerosis      DX-SHOULDER 2+ RIGHT   Final Result      1.  No fracture or dislocation. No significant osteoarthrosis.   2.  Ill-defined pulmonary opacities in the right lung.      [id number] VIEW   Final Result      Nonobstructive bowel gas pattern. Postsurgical changes with multiple surgical clips projecting within the abdomen and pelvis.      EC-ECHOCARDIOGRAM COMPLETE W/O CONT   Final Result      CT-CTA CHEST PULMONARY ARTERY W/ RECONS   Final Result      1.  Somewhat limited exam secondary to respiratory motion. No pulmonary embolus is identified      2.  Bilateral patchy reticulation and bronchiectasis in the periphery of the lungs. Groundglass density is most  pronounced in the lower lobes. There are some patchy opacities in the left upper lobe. Findings are consistent with chronic pulmonary    fibrosis. Superimposed infection may be present. If clinically indicated, follow-up imaging is recommended to ensure resolution of left upper lobe consolidation.      3.  Small bilateral pleural effusions layer posteriorly.      4.  Nonspecific hilar and mediastinal adenopathy      5.  Cardiomegaly and severe atherosclerosis            DX-CHEST-PORTABLE (1 VIEW)   Final Result      Diffuse interstitial opacities and ill-defined opacities within the lung bases which could be related to the history of pulmonary fibrosis although superimposed edema or infection cannot be excluded. Covid 19 should be excluded.          Assessment/Plan:  Acute on chronic respiratory failure with hypoxia (HCC)  Interstitial lung disease (HCC)  Pulmonary Hypertension  Assessment & Plan  - likely secondary to fluid overload, atrial fibrillation, and possible ILD exacerbation  CT negative for PE, with hazy ground glass opacities along bases and small B/L pleural effusions and mediastinal adenopathy.   - leukocytosis (WBC increased from 8 to 17 this AM) with tachycardia, tachypnea, and hypotension also concerned for sepsis of unclear etiology.   - pulm and cardiology consulted.   - Pt decided to pursue comfort care on 2/2/22. Orders placed for pain, air hunger, and anxiety control. Hospice referral placed.     Atrial fibrillation (HCC)- (present on admission)  Diastolic heart failure  TOO   Assessment & Plan  Heart rate 100-130, Troponin 18, proBNP 1864, INR 1.2 on admission.   [id number] =3. Echocardiogram on 1/31/2022 showed LV ejection fraction 55%, long (2.3 cm) calcified mass extending in the left ventricular outflow tract, moderate mitral and tricuspid regurgitation, RVSP 62 mmHg, biatrial enlargement.   - Cardiology consulted, pt volume overloaded (small bilateral pleural effusion on CTA of chest,  elevated BNP, JVP on physical exam), recommend continue diuresis and worsening Cr likely cardiorenal.   - Pt decided to pursue comfort care on 2/2/22. Orders placed.     #Calcified mass noted on Echo  long (2.3 cm) calcified mass extending in the left ventricular outflow tract   - per cards, calficiation extending near from mitral valve to aorta.  - extensive CAD noted on CT scan noted throughout aorta.   - Pt decided to pursue comfort care on 2/2/22. Orders placed.      Shoulder pain, right  Assessment & Plan  Patient reports right shoulder pain for several days/2 weeks?  On physical exam she had some limited range of motion regarding abduction to about 120 degrees, negative Chen and Neer test. Xray with no significant osteoarthritis. Rotator cuff injury is less likely.  Patient may have a component of adhesive capsulitis and also is tender along her trapezius  -Pt decided to pursue comfort care on 2/2/22.      RUQ pain- resolved  Elevated transminases  Assessment & Plan  S/p cholecystectomy  - Likely secondary to hepatocongestion in setting of moderate tricuspid regurgitation/pulmonary hypertension. Worsening AST/ALT/total bilirubin  - Pt decided to pursue comfort care on 2/2/22. Orders placed.      Anxiety  Assessment & Plan      - Pt decided to pursue comfort care on 2/2/22. Orders placed.      Manju Mccain,   Internal Medicine, PGY-1

## 2022-02-02 NOTE — NON-PROVIDER
"This note is intended for the purposes of medical student education and feedback only.   Please refer to the documentation by this patient's assigned medical practitioner for details of care and plans.    Medical Student Progress Note  Note Author: Moytania Vazquez  Date: 2/2/2022    Date of Admission: 1/31/2022  Primary Team: ANT Jo  Attending: Dr. Ayoub  Senior Resident: Dr. Desai  Intern: Dr. Mccain  Medical Student: Moy Vazquez MS3    ID/CC: Khushi Ojeda is a 81 y.o. female with a PMH of interstitial lung disease, breast cancer, uterine cancer, and hypertension who was admitted on 1/31/2022 with atrial fibrillation with RVR, increasing oxygen requirements and acute onset heart failure.     SUBJECTIVE  Interval Update for 2/2/2022  The patient was evaluated by cardiology, critical care and pulmonology the day prior who discontinued the fluids, started furosemide, ceftriaxone, doxycycline and solu-medrol. Unfortunately the patient continued to decline with declining renal function, increasing, liver enzymes, leukocytosis and increased oxygen demand. On exam in the morning the patient was AAOx4 and stated she wished to be placed on comfort care due to her condition. The patient appeared fatigued and stated she felt that she \"couldn't catch her breath.\" After conversing with the patient about her goals for care and her current condition it became clear her wishes were to be placed on comfort care.     Review of Systems  Positive for shortness of breath and feeling of anxiety.  All other ROS negative       OBJECTIVE   Physical Exam:  /46   Pulse 98   Temp 36.6 °C (97.9 °F) (Temporal)   Resp 20   Ht 1.549 m (5' 1\")   Wt 48.1 kg (106 lb 0.7 oz)   SpO2 95%     Intake/Output Summary (Last 24 hours) at 2/2/2022 0834  Last data filed at 2/1/2022 1947  Gross per 24 hour   Intake 240 ml   Output --   Net 240 ml       General: Well developed, well nourished,  female, appears stated age, appears to be " in no acute distress.  HEENT: Normocephalic, atraumatic. EOM grossly intact, PERRLA. Mucous membranes moist. No lymphadenopathy.  Cardio: Normal S1 and S2. Irregular rate and rhythm. Systolic murmur appreciated. JVD noted  Pulmonary: Lungs have bilateral coarse rales throughout  Abdomen: Normoactive bowel sounds. Abdomen is soft and nondistended. No masses. No tenderness to palpation in all four quadrants.   MSK: Normal ROM. Extremities well-perfused. Capillary refill <2 seconds. No LE edema.  Neuro: A&Ox4. CN II-XII grossly intact. Strength and sensation intact throughout. Reflexes 2+ throughout.  Skin: No rash, lesions, or skin ulcers. No jaundice, ecchymoses, or petechiae.   Psych: Appropriate mood and affect.    Lab Results:  Recent Labs     01/31/22  0924 02/01/22  0513 02/02/22  0440   WBC 9.4 8.9 17.2*   RBC 4.20 3.98* 3.81*   HEMOGLOBIN 13.0 12.2 11.8*   HEMATOCRIT 41.9 39.2 39.0   MCV 99.8* 98.5* 102.4*   MCH 31.0 30.7 31.0   RDW 50.6* 49.6 52.8*   PLATELETCT 181 157* 110*   MPV 9.8 9.9 11.5   NEUTSPOLYS 80.00*  --  66.10   LYMPHOCYTES 9.80*  --  0.00*   MONOCYTES 7.90  --  1.70   EOSINOPHILS 1.50  --  0.00   BASOPHILS 0.40  --  0.00   RBCMORPHOLO  --   --  Normal     Recent Labs     01/31/22  0924 01/31/22  0924 02/01/22  0513 02/01/22  2250 02/02/22  0440   SODIUM 142   < > 140 140 140   POTASSIUM 4.0   < > 4.2 4.4 4.7   CHLORIDE 107   < > 100 101 99   CO2 26   < > 28 26 29   BUN 10   < > 19 29* 35*   CREATININE 0.44*   < > 1.07 1.81* 1.96*   CALCIUM 7.7*   < > 8.0* 7.9* 7.9*   MAGNESIUM 1.9  --   --  1.8  --    ALBUMIN 3.4  --  3.5  --  3.2    < > = values in this interval not displayed.     Estimated GFR/CRCL = Estimated Creatinine Clearance: 16.4 mL/min (A) (by C-G formula based on SCr of 1.96 mg/dL (H)).  Recent Labs     02/01/22  0513 02/01/22  2250 02/02/22  0440   GLUCOSE 79 75 83     Recent Labs     01/31/22  0924 02/01/22  0513 02/02/22  0440   ASTSGOT 31 53* 180*   ALTSGPT 46 57* 137*  "  TBILIRUBIN 0.9 1.6* 2.0*   ALKPHOSPHAT 122* 130* 142*   GLOBULIN 2.3 1.9 2.1   INR 1.21*  --  2.68*             Recent Labs     01/31/22  0924 02/02/22  0440   INR 1.21* 2.68*   APTT 35.0  --        Microbiology Results:  Results     Procedure Component Value Units Date/Time    SARS-CoV-2 PCR (24 hour In-House): Collect NP swab in VTM [[id number]] Collected: 02/01/22 1156    Order Status: Completed Specimen: Respirate from Nasopharyngeal Updated: 02/02/22 0734     SARS-CoV-2 Source NP Swab     SARS-CoV-2 by PCR NotDetected     Comment: PATIENTS: Important information regarding your results and instructions can  be found at [email or web address]   \"After your  Covid-19 Test\"    [facility] providers: PLEASE REFER TO DE-ESCALATION AND RETESTING PROTOCOL  on [email or web address]    **The Roche SARS-CoV-2 RT-PCR test has been made available for use under the  Emergency Use Authorization (EUA) only.         Narrative:      Collected By: [id number] LOIS    COV-2, FLU A/B, AND RSV BY PCR (2-4 HOURS "[facility]): Collect NP swab in VTM [[id number]] Collected: 01/31/22 1100    Order Status: Completed Specimen: Respirate Updated: 01/31/22 1155     Influenza virus A RNA Negative     Influenza virus B, PCR Negative     RSV, PCR Negative     SARS-CoV-2 by PCR NotDetected     Comment: PATIENTS: Important information regarding your results and instructions can  be found at [email or web address]   \"After your  Covid-19 Test\"    [facility] providers: PLEASE REFER TO DE-ESCALATION AND RETESTING PROTOCOL  on [email or web address]    **The [employer] GeneXpert Xpress SARS-CoV-2 RT-PCR Test has been made  available for use under the Emergency Use Authorization (EUA) only.          SARS-CoV-2 Source NP Swab          Imaging Results:  DX-CHEST-PORTABLE (1 VIEW)   Final Result         1.  Pulmonary edema and/or infiltrates are identified, which appear somewhat increased since the prior exam.   2.  Trace " bilateral pleural effusions   3.  Cardiomegaly   4.  Atherosclerosis      DX-SHOULDER 2+ RIGHT   Final Result      1.  No fracture or dislocation. No significant osteoarthrosis.   2.  Ill-defined pulmonary opacities in the right lung.      [id number] VIEW   Final Result      Nonobstructive bowel gas pattern. Postsurgical changes with multiple surgical clips projecting within the abdomen and pelvis.      EC-ECHOCARDIOGRAM COMPLETE W/O CONT   Final Result      CT-CTA CHEST PULMONARY ARTERY W/ RECONS   Final Result      1.  Somewhat limited exam secondary to respiratory motion. No pulmonary embolus is identified      2.  Bilateral patchy reticulation and bronchiectasis in the periphery of the lungs. Groundglass density is most pronounced in the lower lobes. There are some patchy opacities in the left upper lobe. Findings are consistent with chronic pulmonary    fibrosis. Superimposed infection may be present. If clinically indicated, follow-up imaging is recommended to ensure resolution of left upper lobe consolidation.      3.  Small bilateral pleural effusions layer posteriorly.      4.  Nonspecific hilar and mediastinal adenopathy      5.  Cardiomegaly and severe atherosclerosis            DX-CHEST-PORTABLE (1 VIEW)   Final Result      Diffuse interstitial opacities and ill-defined opacities within the lung bases which could be related to the history of pulmonary fibrosis although superimposed edema or infection cannot be excluded. Covid 19 should be excluded.          EKG  Results for orders placed or performed during the hospital encounter of 22   EKG   Result Value Ref Range    Report       [facility] Emergency Dept.    Test Date:  2022  Pt Name:    MYRA LAGUNA                 Department: ER  MRN:        [id number]                      Room:       [facility]  Gender:     Female                       Technician: [id number]  :        [id number]                   Requested By:ER TRIAGE  PROTOCOL  Order #:    [id number]                    Reading MD:    Measurements  Intervals                                Axis  Rate:       114                          P:  AK:                                      QRS:        22  QRSD:       90                           T:          -8  QT:         344  QTc:        474    Interpretive Statements  ATRIAL FIBRILLATION, V-RATE    BORDERLINE T ABNORMALITIES, INFERIOR LEADS  No previous ECG available for comparison     EKG   Result Value Ref Range    Report       [facility]    Test Date:  2022  Pt Name:    MYRA [facility]                 Department: 183  MRN:        [id number]                      Room:       T834  Gender:     Female                       Technician: [id number]  :        1940                   Requested By:AMRIT GASTON  Order #:    [id number]                    Reading MD: Bo Perry MD    Measurements  Intervals                                Axis  Rate:       91                           P:  AK:                                      QRS:        38  QRSD:       91                           T:          -18  QT:         342  QTc:        421    Interpretive Statements  ATRIAL FIBRILLATION  NONSPECIFIC REPOL ABNORMALITY, DIFFUSE LEADS  Compared to ECG 2022 18:08:32  No significant changes  Electronically Signed On 2022 10:00:09 PST by Bo Perry MD     EKG   Result Value Ref Range    Report       [facility]    Test Date:  2022  Pt Name:    MYRA [facility]                 Department: 183  MRN:        [id number]                      Room:       T834  Gender:     Female                       Technician: [id number]  :        1940                   Requested By:ELLIE LEON  Order #:    [id number]                    Reading MD: Bo Perry MD    Measurements  Intervals                                Axis  Rate:       87                           P:  AK:                                      QRS:        19  QRSD:        84                           T:          -14  QT:         352  QTc:        424    Interpretive Statements  ATRIAL FIBRILLATION, V-RATE    NONSPECIFIC T ABNORMALITIES, INFERIOR LEADS  ARTIFACT IN LEAD(S) I,II,III,aVR,aVL,aVF,V1,V2,V3,V4,V5,V6  Compared to ECG 02/01/2022 08:35:40  No significant changes  Electronically Signed On 2-2-2022 5:15:56 PST by Bo Perry MD         Current Medications    Current Facility-Administered Medications:   •  cefTRIAXone (Rocephin) syringe 1 g, 1 g, Intravenous, Q24HRS, Helena Suarez M.D., 1 g at 02/01/22 1924  •  doxycycline (VIBRAMYCIN) 100 mg in  mL IVPB, 100 mg, Intravenous, Q12HRS, Helena Suarez M.D., Infusion Ended Prior to Shift at 02/02/22 0523  •  acetaminophen (TYLENOL) suppository 650 mg, 650 mg, Rectal, Q4HRS PRN, Marisabel Brewster M.D., 650 mg at 02/02/22 0325  •  diclofenac sodium (Voltaren) 1 % gel 4 g, 4 g, Topical, 4X/DAY PRN, Marisabel Brewster M.D., 4 g at 02/02/22 0313  •  methylPREDNISolone sod succ (SOLU-MEDROL) 125 MG injection 125 mg, 125 mg, Intravenous, Q6HRS, Lynette Mahajan M.D., 125 mg at 02/02/22 0415  •  LORazepam (ATIVAN) injection 0.26 mg, 0.26 mg, Intravenous, Q4HRS PRN, Marisabel Brewster M.D., 0.26 mg at 02/02/22 0829  •  METHYLPREDNISOLONE SODIUM SUCC 125 MG INJ SOLR, , , ,   •  senna-docusate (PERICOLACE or SENOKOT S) 8.6-50 MG per tablet 2 Tablet, 2 Tablet, Oral, BID, 2 Tablet at 02/01/22 1701 **AND** polyethylene glycol/lytes (MIRALAX) PACKET 1 Packet, 1 Packet, Oral, QDAY PRN **AND** magnesium hydroxide (MILK OF MAGNESIA) suspension 30 mL, 30 mL, Oral, QDAY PRN **AND** bisacodyl (DULCOLAX) suppository 10 mg, 10 mg, Rectal, QDAY PRN, Valerie Garcia M.D.  •  Respiratory Therapy Consult, , Nebulization, Continuous RT, Valerie Garcia M.D.  •  metoprolol tartrate (LOPRESSOR) tablet 25 mg, 25 mg, Oral, TWICE DAILY, Valerie Garcia M.D., 25 mg at 02/01/22 1701  •  acetaminophen (Tylenol) tablet 650 mg, 650 mg, Oral, Q6HRS  PRN, Valerie Garcia M.D., 650 mg at 02/01/22 0603  •  apixaban (ELIQUIS) tablet 2.5 mg, 2.5 mg, Oral, BID, Valerie Garcia M.D., 2.5 mg at 02/01/22 1701  •  ALPRAZolam (XANAX) tablet 0.25 mg, 0.25 mg, Oral, TID PRN, Valerie Garcia M.D., 0.25 mg at 02/01/22 1453  •  tizanidine (ZANAFLEX) tablet 4 mg, 4 mg, Oral, Q12HRS PRN, Valerie Garcia M.D., 4 mg at 02/01/22 1705  •  atorvastatin (LIPITOR) tablet 40 mg, 40 mg, Oral, Nightly, Valerie Garcia M.D., 40 mg at 02/01/22 1701  •  budesonide-formoterol (SYMBICORT) 160-4.5 MCG/ACT inhaler 2 Puff, 2 Puff, Inhalation, BID, Valerie Garcia M.D., 2 Puff at 02/01/22 1701  •  ipratropium-albuterol (DUONEB) nebulizer solution, 3 mL, Nebulization, Q6HRS PRN, Valerie Garcia M.D.  •  DILTIAZem (CARDIZEM) injection 10 mg, 10 mg, Intravenous, Q HOUR PRN, Valerie Garcia M.D.  •  melatonin tablet 5 mg, 5 mg, Oral, QHS, Marisabel Brewster M.D., 5 mg at 02/01/22 1924    ASSESSMENT/PLAN  Khushi Ojeda is a 81 y.o. female with a PMH of interstitial lung disease, hypertension who was admitted on 1/31/2022 with atrial fibrillation with rapid ventricular rate.     Acute on chronic respiratory failure with hypoxia  • Likely secondary to ILD and atrial fibrillation with a possible superimposed infection  • Increased oxygen demand from 2L at home to upwards of 13 during admission  • New leukocytosis of 17.2, no fever  • Rocephin and doxycycline started by pulmonology following evaluation  • Solu-medrol started by critical care team following evaluation last night  • Maintain oxygen as needed to keep patient comfortable  • Continue symbicort and Duoneb PRN for patient comfort    Acute kidney injury  • Increased creatinine from 0.44 on admission to 1.96  • Only 200 mL of urine output in last 24 hours    Atrial fibrillation with rapid ventricular response  • Heart rate 100-130 on admission initially treated with diltiazem pushes  • Diltiazem changed to digoxin by  "cardiology  o Digoxin level found to be 2.7 on 2/1/21 and withheld  • Cardiology had concern about fluid overload and began lasix 40mg IV for concern of diastolic heart failure  • Lasix therapy discontinued when patient transitioned to comfort care    Anxiety  • Patient stated she felt anxious because she \"can't get enough oxygen\"  • Ativan and dilaudid PRN ordered through comfort care orders      PROPHYLAXIS  • DVT: Apixaban  • Other: DNR/DNI    "

## 2022-02-02 NOTE — PROGRESS NOTES
Up date :    Patient was seen earlier last night for hypotension- 2/2 cardiogenic shock with fluid overload.   Patient received a dose of 40 mg IV lasix.  BP improved. Last one checked 100/48 with MAP of 65. Patient remains comfortable,sleeping.  Will add another dose of lasix IV 40 mg @ 6 am.

## 2022-02-02 NOTE — PROGRESS NOTES
Cardiology Daily Progress Note:     Date of Service: 2/2/2022  Primary Team: [facility] Team   Attending: Dr. Tavera  Senior Resident: Dr. Lee    Contact:  [phone]    Chief Complaint:   Cardiology was consulted for   Atrial fibrilliation w/ RVR and volume overload    Dr. Ayoub is the consulting attending    Subjective  Still has air hunger, orthopnea improved. No other pain.       Interval Events   -Rapid response called last night. For hypotension, patient elected not to have invasive procedure/vasopressor hence declined ICU level of care.  -patient started on solumderol 125 q6 hours. Patient electing comfort care and was given low doses of opiates and anxiolytics which may depress blood pressure (explained to patient and grandson risk of hypotensive event)    -creatinine worsening w/ worsening transaminitis + total bilirubinemia increase,   -digoxin level now decreased to 2.6 from 2.7.   -patient elected to be palliative care    Review of Systems:    Review of Systems   Constitutional: Positive for malaise/fatigue and weight loss.        Sleepy   Respiratory: Positive for shortness of breath. Negative for sputum production.    Cardiovascular: Positive for orthopnea (improved). Negative for chest pain, palpitations and leg swelling.   Neurological: Positive for weakness. Negative for dizziness.   Psychiatric/Behavioral: The patient is nervous/anxious.         Air hunger inducing anxiety       Objective Data:   Physical Exam:   Vitals:   Temp:  [36.3 °C (97.3 °F)-37.9 °C (100.2 °F)] 36.6 °C (97.9 °F)  Pulse:  [74-98] 98  Resp:  [20-38] 20  BP: ()/(35-69) 106/46  SpO2:  [92 %-97 %] 95 %       Physical Exam  Constitutional:       General: She is in acute distress.      Appearance: She is ill-appearing and toxic-appearing.      Comments: GCS [id number]=18    HENT:      Mouth/Throat:      Mouth: Mucous membranes are dry.   Eyes:      General: No scleral icterus.  Cardiovascular:      Rate and Rhythm:  Normal rate. Rhythm irregular.      Heart sounds: Murmur heard.        Comments: JVP below angle of jaw while laying below 30 degrees. B/l legs skin showinig crinkling.  Pulmonary:      Effort: Respiratory distress present.      Comments: Mouth breathing, gasping for air. While laying <30 degree angle.   Abdominal:      General: Abdomen is flat. Bowel sounds are normal.      Palpations: Abdomen is soft.   Musculoskeletal:      Right lower leg: Edema present.      Left lower leg: Edema present.   Skin:     Coloration: Skin is not jaundiced.   Neurological:      General: No focal deficit present.      Mental Status: She is oriented to person, place, and time.      Cranial Nerves: No cranial nerve deficit.      Sensory: No sensory deficit.      Motor: Weakness present.   Psychiatric:      Comments: Behavior OK.            Labs:   WBC ct 17.2  Hgb 11.8  Plt 110  .4    Na 140  K 4.7  Bun/crea 35/1.96 from 19/1.07  AST//138 from 53/57   from 130  T.bili 2.0 from1.6      Digoxin level 2.6 from  2.7.     Imaging:   No new imaging      Problem Representation:   Mrs. Kate is a 82 y/o F w/ hx of known ILD/pulmonary fibrosis, bronchiectasis w/o hx of stroke/dysphagia and GERD, ASCVD/CAD per CT chest and echocardiogram showing b/l atrial enlargement w/ acute on chronic hypoxemia respiratory failure 2/2 volume overload and ILD flare, now has hypotension w/ MOF. Patient requesting comfort measure. JVD still elevated and will still require diuresis    Update: Patient now is comfort care. Cardiology signing off.     #Atrial fibrillation w/ RVR  Well controlled on metoprolol 25 mg BID while being adequately diuresed. Physical exam showing patient is well perfused on her extremities. She is not complaining of lightheadedness but more of somnolence likely from benzo and possible from acid base disturbance.     Plan  -continue metoprolol 25 mg BID  -continue diuresis to diurese,   -follow up K and Mg while being on  diuretics  -holding digoxin since pt is supratherapeutic while with worsening TOO    -continue cardiac diet w/ salt 2g/24 hr and 2l/24 hr water restriction  -can add spironolactone for post -diuretic fluid retention if resistant to diuresis and blood pressure, potassium level and creatinine permitting.     -continue apixaban 2.5mg BID  -Cardioversion only if symptomatic.     -appreciate pulmonology and medicine team for improvement of ILD flare      #Type II and III pulm hypertension  #Pulm hypertension 2/2 ILD/pulmonary fibrosis-  #compensatory metabolic alkalosis HCO3 >27  for respiratory acidosis from pulm fibrosis .   #HFpEF diastolic failure/restrictive cardiomyopathy/mild hypertensive cardiomyopathy  JVD improved today but still elevated, w/ crinkling skin of b/l lower leg.     Plan  -continue diuretics and oxygen to improve type II and III pulm HTN  -UOP target net negative 1.5 to 2L /24 hr; strict ins and out  -electrolyte repletion   -contineu cardiac diet.       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     #modrate tricuspid regurgitation -- hepatocongestion (per labs)-- improve RSVP 2/2 pulm HTN  Transminitis worsening     Plan  -continue to improve pulm HTN  -diuretics as needed  -continue oxygen    #dyspnea  #moderate mitral regurgitation   Stable. No chest pain waking patient up while having hypotensive events.     Plan  -follow up cardiology outpatient

## 2022-02-02 NOTE — PROGRESS NOTES
Patient transitioned to comfort care pending hospice evaluation. Pulmonary will sign off.     Kory Costa MD   Pulmonary Critical Care   [facility]

## 2022-02-02 NOTE — CARE PLAN
Problem: Pain - Standard  Goal: Alleviation of pain or a reduction in pain to the patient’s comfort goal  Outcome: Progressing   The patient is Unstable - High likelihood or risk of patient condition declining or worsening    Shift Goals  Clinical Goals: CC  Patient Goals: comfort, pain mgmt, anx mgmt    Progress made toward(s) clinical / shift goals:      Patient is not progressing towards the following goals:

## 2022-02-02 NOTE — PROGRESS NOTES
"Daily Progress Note:     Date of Service: 2/1/2022  Primary Team: [facility] Team   Attending: CAROLYN Ayoub M.D.   Senior Resident: Dr. Helen Desai  Intern: Dr. Manju Mccain  Contact:  [phone]    Patient ID:  81 year old female with past medical history of breast cancer status postmastectomy and chemo therapy, uterine cancer status post hysterectomy in 1983, cholecystectomy, atrial fibrillation, hypertension, interstitial lung disease diagnosed 1-1/2 years ago, anxiety, right shoulder pain secondary to \" blocked artery to right arm\", admitted on 1/31/22 for atrial fibrillation with RVR, acute on chronic respiratory failure    Chief Complaint: Shortness of breath, right shoulder pain, generalized weakness    Interval Update/Subjective:   No acute events overnight, patient noted to have low blood pressure of 76/34 this morning, pt asymptomatic, given 1L fluid bolus with improvement of systolic blood pressure to low 90s. Complains of right shoulder pain this morning, started about 2 weeks ago, denies any pain to her arm or forearm, numbness, tingling.  Patient states her insurance does not cover [facility], and [facility] did not have space for her.  Patient is requesting to speak to palliative and possible hospice. Denies any fever, chills, abdominal pain, nausea, vomiting, chest pain, chest palpitations, shortness of breath, dizziness, or lightheadedness.    Consultants/Specialty:  Cardiology  Pulmonary  Palliative    Review of Systems:    Review of Systems   Constitutional: Negative for chills and fever.   Respiratory: Negative for cough, sputum production and shortness of breath.    Cardiovascular: Negative for chest pain, palpitations and leg swelling.   Gastrointestinal: Negative for abdominal pain, nausea and vomiting.   Genitourinary: Negative.    Musculoskeletal: Positive for joint pain. Negative for myalgias.   Neurological: Negative for dizziness, tingling, sensory change and headaches. "     Objective Data:   Physical Exam:   Vitals:   Temp:  [36.6 °C (97.9 °F)-37.9 °C (100.2 °F)] 36.8 °C (98.3 °F)  Pulse:  [] 92  Resp:  [18-22] 20  BP: ()/(36-69) 107/62  SpO2:  [80 %-96 %] 92 %    Physical Exam  Constitutional:       General: She is not in acute distress.     Comments: somnolent   HENT:      Mouth/Throat:      Mouth: Mucous membranes are dry.   Eyes:      General: No scleral icterus.  Cardiovascular:      Rate and Rhythm: Normal rate. Rhythm irregular.      Heart sounds: Murmur heard.        Comments: JVP up to the angle of the jaw  Pulmonary:      Effort: Respiratory distress (mild) present.      Comments: Bilateral coarse rales  Abdominal:      General: Abdomen is flat. Bowel sounds are normal.      Palpations: Abdomen is soft.      Tenderness: There is no abdominal tenderness. There is no guarding or rebound.   Musculoskeletal:      Right lower leg: No edema.      Left lower leg: No edema.   Skin:     General: Skin is warm and dry.      Coloration: Skin is not jaundiced.   Neurological:      General: No focal deficit present.      Mental Status: She is oriented to person, place, and time.      Cranial Nerves: No cranial nerve deficit.      Sensory: No sensory deficit.      Motor: Weakness present.   Psychiatric:         Mood and Affect: Mood normal.         Behavior: Behavior normal.         Labs:   Recent Results (from the past 24 hour(s))   EKG    Collection Time: 22  6:08 PM   Result Value Ref Range    Report       [facility] Emergency Dept.    Test Date:  2022  Pt Name:    MYRA LAGUNA                 Department: ER  MRN:        [id number]                      Room:       [facility]  Gender:     Female                       Technician: [id number]  :        [id number]                   Requested By:ER TRIAGE PROTOCOL  Order #:    [id number]                    Reading MD:    Measurements  Intervals                                Axis  Rate:       114                           P:  OH:                                      QRS:        22  QRSD:       90                           T:          -8  QT:         344  QTc:        474    Interpretive Statements  ATRIAL FIBRILLATION, V-RATE    BORDERLINE T ABNORMALITIES, INFERIOR LEADS  No previous ECG available for comparison     CBC without Differential    Collection Time: 22  5:13 AM   Result Value Ref Range    WBC 8.9 4.8 - 10.8 K/uL    RBC 3.98 (L) 4.20 - 5.40 M/uL    Hemoglobin 12.2 12.0 - 16.0 g/dL    Hematocrit 39.2 37.0 - 47.0 %    MCV 98.5 (H) 81.4 - 97.8 fL    MCH 30.7 27.0 - 33.0 pg    MCHC 31.1 (L) 33.6 - 35.0 g/dL    RDW 49.6 35.9 - 50.0 fL    Platelet Count 157 (L) 164 - 446 K/uL    MPV 9.9 9.0 - 12.9 fL   Comp Metabolic Panel (CMP)    Collection Time: 22  5:13 AM   Result Value Ref Range    Sodium 140 135 - 145 mmol/L    Potassium 4.2 3.6 - 5.5 mmol/L    Chloride 100 96 - 112 mmol/L    Co2 28 20 - 33 mmol/L    Anion Gap 12.0 7.0 - 16.0    Glucose 79 65 - 99 mg/dL    Bun 19 8 - 22 mg/dL    Creatinine 1.07 0.50 - 1.40 mg/dL    Calcium 8.0 (L) 8.5 - 10.5 mg/dL    AST(SGOT) 53 (H) 12 - 45 U/L    ALT(SGPT) 57 (H) 2 - 50 U/L    Alkaline Phosphatase 130 (H) 30 - 99 U/L    Total Bilirubin 1.6 (H) 0.1 - 1.5 mg/dL    Albumin 3.5 3.2 - 4.9 g/dL    Total Protein 5.4 (L) 6.0 - 8.2 g/dL    Globulin 1.9 1.9 - 3.5 g/dL    A-G Ratio 1.8 g/dL   ESTIMATED GFR    Collection Time: 22  5:13 AM   Result Value Ref Range    GFR If  59 (A) >60 mL/min/1.73 m 2    GFR If Non African American 49 (A) >60 mL/min/1.73 m 2   EKG    Collection Time: 22  8:35 AM   Result Value Ref Range    Report       [facility]    Test Date:  2022  Pt Name:    MYRA LAGUNA                 Department: 183  MRN:        [id number]                      Room:       T834  Gender:     Female                       Technician: CHAYITO  :        1940                   Requested By:AMRIT GASTON  Order #:     [id number]                    Reading MD: Bo Perry MD    Measurements  Intervals                                Axis  Rate:       91                           P:  NC:                                      QRS:        38  QRSD:       91                           T:          -18  QT:         342  QTc:        421    Interpretive Statements  ATRIAL FIBRILLATION  NONSPECIFIC REPOL ABNORMALITY, DIFFUSE LEADS  Compared to ECG 01/31/2022 18:08:32  No significant changes  Electronically Signed On 2-1-2022 10:00:09 PST by Bo Perry MD     DIGOXIN    Collection Time: 02/01/22 10:17 AM   Result Value Ref Range    Digoxin 2.7 (HH) 0.8 - 2.0 ng/mL   SARS-CoV-2 PCR (24 hour In-House): Collect NP swab in VTM    Collection Time: 02/01/22 11:56 AM    Specimen: Nasopharyngeal; Respirate   Result Value Ref Range    SARS-CoV-2 Source NP Swab        Imaging:   Independant Imaging Review: Completed  DX-SHOULDER 2+ RIGHT   Final Result      1.  No fracture or dislocation. No significant osteoarthrosis.   2.  Ill-defined pulmonary opacities in the right lung.      [id number] VIEW   Final Result      Nonobstructive bowel gas pattern. Postsurgical changes with multiple surgical clips projecting within the abdomen and pelvis.      EC-ECHOCARDIOGRAM COMPLETE W/O CONT   Final Result      CT-CTA CHEST PULMONARY ARTERY W/ RECONS   Final Result      1.  Somewhat limited exam secondary to respiratory motion. No pulmonary embolus is identified      2.  Bilateral patchy reticulation and bronchiectasis in the periphery of the lungs. Groundglass density is most pronounced in the lower lobes. There are some patchy opacities in the left upper lobe. Findings are consistent with chronic pulmonary    fibrosis. Superimposed infection may be present. If clinically indicated, follow-up imaging is recommended to ensure resolution of left upper lobe consolidation.      3.  Small bilateral pleural effusions layer posteriorly.      4.  Nonspecific  hilar and mediastinal adenopathy      5.  Cardiomegaly and severe atherosclerosis            DX-CHEST-PORTABLE (1 VIEW)   Final Result      Diffuse interstitial opacities and ill-defined opacities within the lung bases which could be related to the history of pulmonary fibrosis although superimposed edema or infection cannot be excluded. Covid 19 should be excluded.          Assessment/Plan:  Atrial fibrillation (HCC)- (present on admission)  Diastolic heart failure  TOO   Assessment & Plan  Heart rate 100-130, Troponin 18, proBNP 1864, INR 1.2 on admission.   [id number] =3  - likely secondary from volume overload.  - Patient was treated with diltiazem pushes and restarted on her home medication of metoprolol tartrate 25mg BID.  She was not on anticoagulation at home and was initiated on apixaban 2.5 mg dose for age and weight.  - Cardiology consulted, pt started on digoxin IV with plan for transition to PO.   - Obtained digoxin level today: 2.7, will hold digoxin in setting of worsening Cr. Will monitor for digoxin toxicity.  - can titrate up Metoprolol if BP permits, or if fails can add diltiazem and titrate as appropriate per HR.   - Echocardiogram on 1/31/2022 showed LV ejection fraction 55%, long (2.3 cm) calcified mass extending in the left ventricular outflow tract, moderate mitral and tricuspid regurgitation, RVSP 62 mmHg, biatrial enlargement.             -Suggestive of volume overload in addition to small bilateral pleural effusion on CTA of chest, elevated BNP, JVP on physical exam.              -Per cards recommendation, patient is volume overloaded, Lasix 40mg IV daily initiated, with subsequent improvement of low BP.   - worsening Cr, likely cardiorenal, will continue diuresis.   - replace electrolytes as appropriate  - Telemetry  - Cardiac diet, with salt and fluid restrictions.   - Monitor I/O  - Records requested from [facility].    Acute on chronic respiratory failure with hypoxia  (HCC)  Interstitial lung disease (HCC)  Pulmonary Hypertension  Assessment & Plan  2L supplemental oxygen at home, currently 5L O2 NC.   - likely secondary to fluid overload, atrial fibrillation, and ILD  CT negative for PE, with hazy ground glass opacities along bases and small B/L pleural effusions and mediastinal adenopathy.   - On home 2 L now on 5L O2   - COVID negative on admission, will repeat.   - no leukocytosis, procalcitonin 0.05, less suspicious of infectious process  - pulm consulted to assess for possible worsening fibrosis/pulm hypertension  - Continue symbicort and Duoneb PRN.   - Palliative consulted, pt requesting.    #Calcified mass noted on Echo  long (2.3 cm) calcified mass extending in the left ventricular outflow tract   - per cards, calficiation extending near from mitral valve to aorta.  - extensive CAD noted on CT scan noted throughout aorta.   - continue to monitor    Shoulder pain, right  Assessment & Plan  Patient reports right shoulder pain for several days/2 weeks?  On physical exam she had some limited range of motion regarding abduction to about 120 degrees, negative Chen and Neer test. Xray with no significant osteoarthritis. Rotator cuff injury is less likely.  Patient may have a component of adhesive capsulitis and also is tender along her trapezius  -PRN Tizanidine for 3 doses   - PT consulted     RUQ pain- resolved  Assessment & Plan  S/p cholecystectomy  - KUB with no obstructive bowel pattern  - Pain resolved following 1 time dose toradol  - Mildly elevated AST 53, ALT 57, T. Bilirubin 1.6. Likely secondary to hepatocongestion in setting of moderate tricuspid regurgitation/pulmonary hypertension. Continue to monitor.     Anxiety  Assessment & Plan  Continue home Xanax .25mg TID PRN    Manju Mccain DO  Internal Medicine, PGY-1

## 2022-02-03 LAB
CCP IGG SERPL-ACNC: 4 UNITS (ref 0–19)
NUCLEAR IGG SER QL IA: NORMAL

## 2022-02-03 NOTE — PROGRESS NOTES
Pt , pronounced by 2 RNs, Petrona and Elisa, at 1826. Family present at bedside. On call MD Brewster paged.

## 2022-02-04 LAB
CENTROMERE IGG TITR SER IF: 0 AU/ML (ref 0–40)
ENA JO1 AB TITR SER: 0 AU/ML (ref 0–40)
ENA SCL70 IGG SER QL: 1 AU/ML (ref 0–40)
ENA SM IGG SER-ACNC: 0 AU/ML (ref 0–40)
ENA SS-B IGG SER IA-ACNC: 0 AU/ML (ref 0–40)
RIBOSOMAL P AB SER-ACNC: 0 AU/ML (ref 0–40)
SSA52 R0ENA AB IGG Q0420: 0 AU/ML (ref 0–40)
SSA60 R0ENA AB IGG Q0419: 0 AU/ML (ref 0–40)
U1 SNRNP IGG SER QL: 2 UNITS (ref 0–19)

## 2022-02-06 LAB
A FLAVUS AB SER QL ID: NORMAL
A FUMIGATUS1 AB SER QL ID: NORMAL
A FUMIGATUS2 AB SER QL: NORMAL
A FUMIGATUS3 AB SER QL ID: NORMAL
A FUMIGATUS6 AB SER QL ID: NORMAL
A PULLULANS AB SER QL ID: NORMAL
PIGEON SERUM AB QL ID: NORMAL
S RECTIVIRGULA AB SER QL ID: NORMAL
S VIRIDIS AB SER QL: NORMAL
T CANDIDUS AB SER QL: NORMAL
T VULGARIS AB SER QL ID: NORMAL

## 2022-02-06 NOTE — DISCHARGE SUMMARY
Death Summary    Cause of Death  Acute respiratory failure due to interstitial lung disease exacerbation and diastolic heart failure.     Comorbid Conditions at the Time of Death  Principal Problem:    Acute on chronic respiratory failure with hypoxia (HCC) POA: Unknown  Active Problems:    Interstitial lung disease (HCC) POA: Unknown    Diastolic heart failure (HCC) POA: Unknown    TOO (acute kidney injury) (HCC) POA: Unknown    Atrial fibrillation (HCC) POA: Yes    Hypertension POA: Unknown    Shoulder pain, right POA: Unknown    RUQ pain POA: Unknown    Anxiety POA: Unknown    Pulmonary hypertension (HCC) POA: Unknown  Resolved Problems:    * No resolved hospital problems. *      History of Presenting Illness and Hospital Course  81 year old female with past medical history of breast cancer status postmastectomy and chemotherapy, uterine cancer status post hysterectomy in 1983, cholecystectomy, atrial fibrillation, hypertension, interstitial lung disease diagnosed 1-1/2 years ago, anxiety, right shoulder pain secondary, admitted on 1/31/22 for atrial fibrillation with RVR and acute on chronic respiratory failure.  Atrial fibrillation initially treated with diltiazem pushes in the ED patient's heart rate remained high, cardiology was consulted and patient was started on IV digoxin with plans for transition to p.o. however digoxin level was high and medication was held, and home metoprolol was continued.  Patient required increase oxygen demand with 5 L oxygen nasal cannula, compared to 2L supplemental oxygen at home.  CTA chest negative for pulmonary embolism, showed hazy groundglass opacities in lung bases and small bilateral pleural effusion and mediastinal adenopathy.  Echocardiogram on 1/31/2022 showed left ventricular ejection fraction 55% and a long calcified mass 2.3 cm extending into the left ventricular outflow tract, moderate mitral and tricuspid regurgitation, RVSP 62mmHg, and biatrial enlargement.   Findings were suggestive of volume overload in setting of diastolic dysfunction in addition to physical exam exhibiting JVD, per cardiology recommendation patient was initiated on diuresis.  Patient had worsening creatinine from baseline, likely cardiorenal syndrome. Pulmonary consulted to assess for possible worsening fibrosis and pulmonary hypertension.  Patient had increased respiratory distress and anxiety with low blood pressure with MAP of 50-55. Patient alert and oriented x4, and per her request, decision made to transition to comfort care on the morning of 22. Patient  on 22 at 1826 with family at bedside.      Death Date: 22   Death Time:          Pronounced By (RN1): Petrona MUELLER  Pronounced By (RN2): Elisa HARRIS